# Patient Record
Sex: FEMALE | Race: WHITE | Employment: UNEMPLOYED | URBAN - METROPOLITAN AREA
[De-identification: names, ages, dates, MRNs, and addresses within clinical notes are randomized per-mention and may not be internally consistent; named-entity substitution may affect disease eponyms.]

---

## 2017-09-27 PROBLEM — R01.1 MURMUR, CARDIAC: Status: ACTIVE | Noted: 2017-09-27

## 2017-10-11 ENCOUNTER — HOSPITAL ENCOUNTER (OUTPATIENT)
Dept: CT IMAGING | Age: 52
Discharge: HOME | End: 2017-10-11
Attending: INTERNAL MEDICINE
Payer: SELF-PAY

## 2017-10-11 DIAGNOSIS — R01.1 MURMUR, CARDIAC: ICD-10-CM

## 2017-10-11 DIAGNOSIS — R07.89 OTHER CHEST PAIN: ICD-10-CM

## 2017-10-11 PROCEDURE — 75571 CT HRT W/O DYE W/CA TEST: CPT

## 2017-12-28 PROBLEM — F33.9 RECURRENT DEPRESSION (HCC): Status: ACTIVE | Noted: 2017-12-28

## 2017-12-28 PROBLEM — R07.9 CHEST PAIN: Status: ACTIVE | Noted: 2017-12-28

## 2019-06-03 ENCOUNTER — HOSPITAL ENCOUNTER (OUTPATIENT)
Age: 54
Setting detail: OBSERVATION
Discharge: HOME | End: 2019-06-04
Attending: INTERNAL MEDICINE | Admitting: INTERNAL MEDICINE
Payer: MEDICARE

## 2019-06-03 PROBLEM — F41.9 ANXIETY AND DEPRESSION: Status: ACTIVE | Noted: 2017-12-28

## 2019-06-03 PROBLEM — D72.829 LEUKOCYTOSIS: Status: ACTIVE | Noted: 2019-06-03

## 2019-06-03 PROBLEM — R73.03 PREDIABETES: Status: ACTIVE | Noted: 2019-06-03

## 2019-06-03 PROBLEM — Q24.8 PERICARDIAL CYST: Status: ACTIVE | Noted: 2019-06-03

## 2019-06-03 PROBLEM — F32.A ANXIETY AND DEPRESSION: Status: ACTIVE | Noted: 2017-12-28

## 2019-06-03 PROCEDURE — 65390000012 HC CONDITION CODE 44 OBSERVATION

## 2019-06-03 PROCEDURE — 65660000004 HC RM CVT STEPDOWN

## 2019-06-03 PROCEDURE — 74011250637 HC RX REV CODE- 250/637: Performed by: INTERNAL MEDICINE

## 2019-06-03 RX ORDER — VERAPAMIL HYDROCHLORIDE 240 MG/1
240 TABLET, FILM COATED, EXTENDED RELEASE ORAL
Status: DISCONTINUED | OUTPATIENT
Start: 2019-06-03 | End: 2019-06-04 | Stop reason: HOSPADM

## 2019-06-03 RX ORDER — PRAVASTATIN SODIUM 20 MG/1
40 TABLET ORAL
Status: DISCONTINUED | OUTPATIENT
Start: 2019-06-03 | End: 2019-06-04 | Stop reason: HOSPADM

## 2019-06-03 RX ORDER — DIAZEPAM 5 MG/1
5 TABLET ORAL 2 TIMES DAILY
Status: DISCONTINUED | OUTPATIENT
Start: 2019-06-03 | End: 2019-06-04 | Stop reason: HOSPADM

## 2019-06-03 RX ORDER — FLUOXETINE HYDROCHLORIDE 20 MG/1
20 CAPSULE ORAL 2 TIMES DAILY
Status: DISCONTINUED | OUTPATIENT
Start: 2019-06-03 | End: 2019-06-04 | Stop reason: HOSPADM

## 2019-06-03 RX ORDER — PERPHENAZINE 2 MG/1
16 TABLET, FILM COATED ORAL
Status: DISCONTINUED | OUTPATIENT
Start: 2019-06-03 | End: 2019-06-04 | Stop reason: HOSPADM

## 2019-06-03 RX ORDER — TRAMADOL HYDROCHLORIDE 50 MG/1
50 TABLET ORAL
Status: DISCONTINUED | OUTPATIENT
Start: 2019-06-03 | End: 2019-06-04 | Stop reason: HOSPADM

## 2019-06-03 RX ORDER — ZOLPIDEM TARTRATE 5 MG/1
5 TABLET ORAL
Status: DISCONTINUED | OUTPATIENT
Start: 2019-06-03 | End: 2019-06-04 | Stop reason: HOSPADM

## 2019-06-03 RX ORDER — HYDROCHLOROTHIAZIDE 25 MG/1
25 TABLET ORAL DAILY
Status: DISCONTINUED | OUTPATIENT
Start: 2019-06-04 | End: 2019-06-04 | Stop reason: HOSPADM

## 2019-06-03 RX ORDER — PANTOPRAZOLE SODIUM 40 MG/1
40 TABLET, DELAYED RELEASE ORAL
Status: DISCONTINUED | OUTPATIENT
Start: 2019-06-04 | End: 2019-06-04 | Stop reason: HOSPADM

## 2019-06-03 RX ORDER — FLUOXETINE HYDROCHLORIDE 20 MG/1
20 CAPSULE ORAL DAILY
COMMUNITY
End: 2021-09-29

## 2019-06-03 RX ORDER — AMITRIPTYLINE HYDROCHLORIDE 25 MG/1
50 TABLET, FILM COATED ORAL
Status: DISCONTINUED | OUTPATIENT
Start: 2019-06-03 | End: 2019-06-04 | Stop reason: HOSPADM

## 2019-06-03 RX ORDER — NITROGLYCERIN 0.4 MG/1
0.4 TABLET SUBLINGUAL
Status: DISCONTINUED | OUTPATIENT
Start: 2019-06-03 | End: 2019-06-04 | Stop reason: HOSPADM

## 2019-06-03 RX ORDER — SODIUM CHLORIDE 0.9 % (FLUSH) 0.9 %
5-40 SYRINGE (ML) INJECTION AS NEEDED
Status: DISCONTINUED | OUTPATIENT
Start: 2019-06-03 | End: 2019-06-04 | Stop reason: HOSPADM

## 2019-06-03 RX ORDER — SODIUM CHLORIDE 0.9 % (FLUSH) 0.9 %
5-40 SYRINGE (ML) INJECTION EVERY 8 HOURS
Status: DISCONTINUED | OUTPATIENT
Start: 2019-06-03 | End: 2019-06-04 | Stop reason: HOSPADM

## 2019-06-03 RX ADMIN — PRAVASTATIN SODIUM 40 MG: 20 TABLET ORAL at 22:21

## 2019-06-03 RX ADMIN — AMITRIPTYLINE HYDROCHLORIDE 50 MG: 25 TABLET, FILM COATED ORAL at 22:17

## 2019-06-03 RX ADMIN — PERPHENAZINE 16 MG: 2 TABLET, FILM COATED ORAL at 22:17

## 2019-06-03 RX ADMIN — VERAPAMIL HYDROCHLORIDE 240 MG: 240 TABLET, FILM COATED, EXTENDED RELEASE ORAL at 22:17

## 2019-06-03 RX ADMIN — TRAMADOL HYDROCHLORIDE 50 MG: 50 TABLET, FILM COATED ORAL at 23:15

## 2019-06-03 RX ADMIN — Medication 10 ML: at 22:25

## 2019-06-03 RX ADMIN — FLUOXETINE 20 MG: 20 CAPSULE ORAL at 22:17

## 2019-06-03 RX ADMIN — ZOLPIDEM TARTRATE 5 MG: 5 TABLET ORAL at 22:22

## 2019-06-04 VITALS
RESPIRATION RATE: 22 BRPM | OXYGEN SATURATION: 93 % | HEART RATE: 77 BPM | DIASTOLIC BLOOD PRESSURE: 68 MMHG | TEMPERATURE: 99.2 F | BODY MASS INDEX: 39.11 KG/M2 | WEIGHT: 235 LBS | SYSTOLIC BLOOD PRESSURE: 122 MMHG

## 2019-06-04 PROBLEM — R01.1 MURMUR, CARDIAC: Chronic | Status: ACTIVE | Noted: 2017-09-27

## 2019-06-04 PROBLEM — F41.9 ANXIETY AND DEPRESSION: Chronic | Status: ACTIVE | Noted: 2017-12-28

## 2019-06-04 PROBLEM — Q24.8 PERICARDIAL CYST: Chronic | Status: ACTIVE | Noted: 2019-06-03

## 2019-06-04 PROBLEM — R73.03 PREDIABETES: Chronic | Status: ACTIVE | Noted: 2019-06-03

## 2019-06-04 PROBLEM — R07.9 CHEST PAIN: Status: RESOLVED | Noted: 2017-12-28 | Resolved: 2019-06-04

## 2019-06-04 PROBLEM — D72.829 LEUKOCYTOSIS: Chronic | Status: ACTIVE | Noted: 2019-06-03

## 2019-06-04 PROBLEM — F32.A ANXIETY AND DEPRESSION: Chronic | Status: ACTIVE | Noted: 2017-12-28

## 2019-06-04 LAB
ANION GAP SERPL CALC-SCNC: 9 MMOL/L (ref 7–16)
ATRIAL RATE: 79 BPM
BUN SERPL-MCNC: 8 MG/DL (ref 6–23)
CALCIUM SERPL-MCNC: 9.4 MG/DL (ref 8.3–10.4)
CALCULATED P AXIS, ECG09: 65 DEGREES
CALCULATED R AXIS, ECG10: 34 DEGREES
CALCULATED T AXIS, ECG11: 65 DEGREES
CHLORIDE SERPL-SCNC: 105 MMOL/L (ref 98–107)
CHOLEST SERPL-MCNC: 177 MG/DL
CO2 SERPL-SCNC: 24 MMOL/L (ref 21–32)
CREAT SERPL-MCNC: 0.71 MG/DL (ref 0.6–1)
DIAGNOSIS, 93000: NORMAL
ERYTHROCYTE [DISTWIDTH] IN BLOOD BY AUTOMATED COUNT: 14.5 % (ref 11.9–14.6)
GLUCOSE SERPL-MCNC: 109 MG/DL (ref 65–100)
HCT VFR BLD AUTO: 40.4 % (ref 35.8–46.3)
HDLC SERPL-MCNC: 73 MG/DL (ref 40–60)
HDLC SERPL: 2.4 {RATIO}
HGB BLD-MCNC: 13.1 G/DL (ref 11.7–15.4)
INR PPP: 1.2
LDLC SERPL CALC-MCNC: 93.4 MG/DL
LIPID PROFILE,FLP: ABNORMAL
MCH RBC QN AUTO: 30 PG (ref 26.1–32.9)
MCHC RBC AUTO-ENTMCNC: 32.4 G/DL (ref 31.4–35)
MCV RBC AUTO: 92.4 FL (ref 79.6–97.8)
NRBC # BLD: 0 K/UL (ref 0–0.2)
P-R INTERVAL, ECG05: 154 MS
PLATELET # BLD AUTO: 242 K/UL (ref 150–450)
PMV BLD AUTO: 10.2 FL (ref 9.4–12.3)
POTASSIUM SERPL-SCNC: 4 MMOL/L (ref 3.5–5.1)
PROTHROMBIN TIME: 15 SEC (ref 11.7–14.5)
Q-T INTERVAL, ECG07: 398 MS
QRS DURATION, ECG06: 94 MS
QTC CALCULATION (BEZET), ECG08: 456 MS
RBC # BLD AUTO: 4.37 M/UL (ref 4.05–5.2)
SODIUM SERPL-SCNC: 138 MMOL/L (ref 136–145)
TRIGL SERPL-MCNC: 53 MG/DL (ref 35–150)
TROPONIN I SERPL-MCNC: <0.02 NG/ML (ref 0.02–0.05)
TROPONIN I SERPL-MCNC: <0.02 NG/ML (ref 0.02–0.05)
VENTRICULAR RATE, ECG03: 79 BPM
VLDLC SERPL CALC-MCNC: 10.6 MG/DL (ref 6–23)
WBC # BLD AUTO: 13.6 K/UL (ref 4.3–11.1)

## 2019-06-04 PROCEDURE — 65390000012 HC CONDITION CODE 44 OBSERVATION

## 2019-06-04 PROCEDURE — 99218 HC RM OBSERVATION: CPT

## 2019-06-04 PROCEDURE — 93005 ELECTROCARDIOGRAM TRACING: CPT | Performed by: NURSE PRACTITIONER

## 2019-06-04 PROCEDURE — 77030032490 HC SLV COMPR SCD KNE COVD -B

## 2019-06-04 PROCEDURE — 85610 PROTHROMBIN TIME: CPT

## 2019-06-04 PROCEDURE — 74011250637 HC RX REV CODE- 250/637: Performed by: INTERNAL MEDICINE

## 2019-06-04 PROCEDURE — 84484 ASSAY OF TROPONIN QUANT: CPT

## 2019-06-04 PROCEDURE — 36415 COLL VENOUS BLD VENIPUNCTURE: CPT

## 2019-06-04 PROCEDURE — 93306 TTE W/DOPPLER COMPLETE: CPT

## 2019-06-04 PROCEDURE — 80048 BASIC METABOLIC PNL TOTAL CA: CPT

## 2019-06-04 PROCEDURE — 80061 LIPID PANEL: CPT

## 2019-06-04 PROCEDURE — 85027 COMPLETE CBC AUTOMATED: CPT

## 2019-06-04 RX ADMIN — PANTOPRAZOLE SODIUM 40 MG: 40 TABLET, DELAYED RELEASE ORAL at 05:26

## 2019-06-04 RX ADMIN — TRAMADOL HYDROCHLORIDE 50 MG: 50 TABLET, FILM COATED ORAL at 07:18

## 2019-06-04 RX ADMIN — Medication 10 ML: at 05:26

## 2019-06-04 RX ADMIN — TRAMADOL HYDROCHLORIDE 50 MG: 50 TABLET, FILM COATED ORAL at 13:17

## 2019-06-04 RX ADMIN — DIAZEPAM 5 MG: 5 TABLET ORAL at 09:00

## 2019-06-04 RX ADMIN — FLUOXETINE 20 MG: 20 CAPSULE ORAL at 07:18

## 2019-06-04 RX ADMIN — HYDROCHLOROTHIAZIDE 25 MG: 25 TABLET ORAL at 12:20

## 2019-06-04 NOTE — PROGRESS NOTES
Hospitalist Progress Note     Admit Date:  6/3/2019  8:19 PM   Name:  Taina Son   Age:  52 y.o.  :  1965   MRN:  [id number]   PCP:  Óscar Blanchard MD  Treatment Team: Attending Provider: Van Isidro DO; Consulting Provider: Erlinda Alvarenga MD; Utilization Review: Lizeth Muhammad RN; Care Manager: Yola Quiroz    HPI/Subjective:   Pt directly admitted from outside ER for a pericardial cyst, chest pain. Cyst noted on previous CT in 2017 also     - no CP, SOB, fevers. Feels fine. Objective:     Patient Vitals for the past 24 hrs:   Temp Pulse Resp BP SpO2   19 1147 99.2 °F (37.3 °C) 77 22 122/68 93 %   19 0709 98.7 °F (37.1 °C) 77 20 112/54 93 %   19 0219 98.8 °F (37.1 °C) 74 18 110/56 92 %   19 2311 98.4 °F (36.9 °C) 85 18 153/77 96 %   19 2155 -- 84 -- 152/71 --   195 -- (!) 117 -- 135/66 --   19 98.9 °F (37.2 °C) 89 18 140/68 96 %     Oxygen Therapy  O2 Sat (%): 93 % (19 1147)  Pulse via Oximetry: 77 beats per minute (19 1147)  O2 Device: Room air (19 0219)      Intake/Output Summary (Last 24 hours) at 2019 1207  Last data filed at 2019 0721  Gross per 24 hour   Intake 1060 ml   Output 1900 ml   Net -840 ml       *Note that automatically entered I/Os may not be accurate; dependent on patient compliance with collection and accurate  by [employer]rTouch. General:    Well nourished. Alert. Extremities: Warm and dry. No cyanosis or edema. Skin:     No rashes or jaundice.    Neuro:  No gross focal deficits    Data Review:  I have reviewed all labs, meds, and studies from the last 24 hours:    Recent Results (from the past 24 hour(s))   TROPONIN I    Collection Time: 19 12:38 AM   Result Value Ref Range    Troponin-I, Qt. <0.02 (L) 0.02 - 0.05 NG/ML   CBC W/O DIFF    Collection Time: 19  4:40 AM   Result Value Ref Range    WBC 13.6 (H) 4.3 - 11.1 K/uL    RBC 4.37 4.05 - 5.2 M/uL    HGB 13.1 11.7 - 15.4 g/dL    HCT 40.4 35.8 - 46.3 %    MCV 92.4 79.6 - 97.8 FL    MCH 30.0 26.1 - 32.9 PG    MCHC 32.4 31.4 - 35.0 g/dL    RDW 14.5 11.9 - 14.6 %    PLATELET 980 483 - 373 K/uL    MPV 10.2 9.4 - 12.3 FL    ABSOLUTE NRBC 0.00 0.0 - 0.2 K/uL   METABOLIC PANEL, BASIC    Collection Time: 06/04/19  4:40 AM   Result Value Ref Range    Sodium 138 136 - 145 mmol/L    Potassium 4.0 3.5 - 5.1 mmol/L    Chloride 105 98 - 107 mmol/L    CO2 24 21 - 32 mmol/L    Anion gap 9 7 - 16 mmol/L    Glucose 109 (H) 65 - 100 mg/dL    BUN 8 6 - 23 MG/DL    Creatinine 0.71 0.6 - 1.0 MG/DL    GFR est AA >60 >60 ml/min/1.73m2    GFR est non-AA >60 >60 ml/min/1.73m2    Calcium 9.4 8.3 - 10.4 MG/DL   TROPONIN I    Collection Time: 06/04/19  4:40 AM   Result Value Ref Range    Troponin-I, Qt. <0.02 (L) 0.02 - 0.05 NG/ML   LIPID PANEL    Collection Time: 06/04/19  4:40 AM   Result Value Ref Range    LIPID PROFILE          Cholesterol, total 177 <200 MG/DL    Triglyceride 53 35 - 150 MG/DL    HDL Cholesterol 73 (H) 40 - 60 MG/DL    LDL, calculated 93.4 <100 MG/DL    VLDL, calculated 10.6 6.0 - 23.0 MG/DL    CHOL/HDL Ratio 2.4     PROTHROMBIN TIME + INR    Collection Time: 06/04/19  4:40 AM   Result Value Ref Range    Prothrombin time 15.0 (H) 11.7 - 14.5 sec    INR 1.2          All Micro Results     None          No results found for this visit on 06/03/19.     Current Meds:  Current Facility-Administered Medications   Medication Dose Route Frequency    amitriptyline (ELAVIL) tablet 50 mg  50 mg Oral QHS    diazePAM (VALIUM) tablet 5 mg  5 mg Oral BID    hydroCHLOROthiazide (HYDRODIURIL) tablet 25 mg  25 mg Oral DAILY    pantoprazole (PROTONIX) tablet 40 mg  40 mg Oral ACB    perphenazine (TRILAFON) tablet tab 16 mg  16 mg Oral QHS    pravastatin (PRAVACHOL) tablet 40 mg  40 mg Oral QHS    verapamil ER (CALAN-SR) tablet 240 mg  240 mg Oral QHS    zolpidem (AMBIEN) tablet 5 mg  5 mg Oral QHS PRN    sodium chloride (NS) flush 5-40 mL  5-40 mL IntraVENous Q8H    sodium chloride (NS) flush 5-40 mL  5-40 mL IntraVENous PRN    nitroglycerin (NITROSTAT) tablet 0.4 mg  0.4 mg SubLINGual Q5MIN PRN    promethazine (PHENERGAN) with saline injection 12.5 mg  12.5 mg IntraVENous Q6H PRN    FLUoxetine (PROzac) capsule 20 mg  20 mg Oral BID    traMADol (ULTRAM) tablet 50 mg  50 mg Oral Q6H PRN       Other Studies (last 24 hours):  No results found. Assessment and Plan:     Hospital Problems as of 6/4/2019 Date Reviewed: 12/28/2017          Codes Class Noted - Resolved POA    * (Principal) Pericardial cyst ICD-10-CM: Q24.8  ICD-9-CM: 746.89  6/3/2019 - Present Yes        Prediabetes (Chronic) ICD-10-CM: R73.03  ICD-9-CM: 790.29  6/3/2019 - Present Yes        Leukocytosis (Chronic) ICD-10-CM: S66.042  ICD-9-CM: 288.60  6/3/2019 - Present Yes        Anxiety and depression (Chronic) ICD-10-CM: F41.9, F32.9  ICD-9-CM: 300.00, 311  12/28/2017 - Present Yes        Chest pain ICD-10-CM: R07.9  ICD-9-CM: 786.50  12/28/2017 - Present Yes        Murmur, cardiac ICD-10-CM: R01.1  ICD-9-CM: 785.2  9/27/2017 - Present Yes              Plan:  · Pericardial cyst doesn't appear to be new finding, maybe slightly larger now. Noted on CT in Sept 2017. · Cardiology managing  · Cont home meds for chronic conditions  · Leukocytosis stable.  No fevers    DC planning/Dispo:    -discharge when OK with cardiology    Diet:  DIET NPO    Signed:  Lluvia Coppola MD

## 2019-06-04 NOTE — CONSULTS
[facility] Cardiology Consult     Attending Cardiologist:Dr. Enoc Cho     Primary Cardiologist:Dr. Luis Alberto Joseph     Primary Care Physician:Dr. Yun Aguilar     Referring--Dr. Robertson General cyst    Subjective:     Taina Son is a 52 y.o. mentally challenged female who Dr. Tierney Luna has seen in past for atypical chest pain, noting normal Echo and nuclear stress test in last two years. She presented to [facility] with complaints of dull chest discomfort. CT chest describes 7.3 x 5.1 x 7.7 cm loculated cystic structure adjacent to the left heart border, concerning for a cyst or effusion. Serial cardiac enzymes were negative. She continues to have mild chest discomfort. NO associated SOB, diaphoresis, nausea or vomiting with CP. Denies exertional chest discomfort. Nonsmoker. NO prior heart cath. CT chest in 2017 with cystic lesion described then. She states she never had follow up regarding this study. She is not tachycardic or SOB on my exam. She appears to be in no distress. Pt does endorse hx of pre DM, HTN.          Past Medical History:   Diagnosis Date    Allergic rhinitis     Depression     Fibrocystic disease of breast     Gastroesophageal reflux disease     Hyperlipidemia     Hypertension       Past Surgical History:   Procedure Laterality Date    HX OTHER SURGICAL  2007    cyst removed from right breast    HX TONSILLECTOMY        Current Facility-Administered Medications   Medication Dose Route Frequency    amitriptyline (ELAVIL) tablet 50 mg  50 mg Oral QHS    diazePAM (VALIUM) tablet 5 mg  5 mg Oral BID    hydroCHLOROthiazide (HYDRODIURIL) tablet 25 mg  25 mg Oral DAILY    pantoprazole (PROTONIX) tablet 40 mg  40 mg Oral ACB    perphenazine (TRILAFON) tablet tab 16 mg  16 mg Oral QHS    pravastatin (PRAVACHOL) tablet 40 mg  40 mg Oral QHS    verapamil ER (CALAN-SR) tablet 240 mg  240 mg Oral QHS    zolpidem (AMBIEN) tablet 5 mg  5 mg Oral QHS PRN    sodium chloride (NS) flush 5-40 mL  5-40 mL IntraVENous Q8H    sodium chloride (NS) flush 5-40 mL  5-40 mL IntraVENous PRN    nitroglycerin (NITROSTAT) tablet 0.4 mg  0.4 mg SubLINGual Q5MIN PRN    promethazine (PHENERGAN) with saline injection 12.5 mg  12.5 mg IntraVENous Q6H PRN    FLUoxetine (PROzac) capsule 20 mg  20 mg Oral BID    traMADol (ULTRAM) tablet 50 mg  50 mg Oral Q6H PRN     Allergies   Allergen Reactions    Ace Inhibitors Cough    Benadryl [Diphenhydramine Hcl] Unknown (comments)    Ciprofloxacin Unknown (comments)    Motrin [Ibuprofen] Unknown (comments)    Penicillins Unknown (comments)    Plavix [Clopidogrel] Unknown (comments)    Wellbutrin [Bupropion Hcl] Unknown (comments)      Social History     Tobacco Use    Smoking status: Never Smoker    Smokeless tobacco: Never Used   Substance Use Topics    Alcohol use: No      No family history on file. Review of Systems  Gen: Denies fever, chills, malaise or fatigue. Appetite good. HEENT: Denies frequent headaches, dizzyness, visual disturbances, Neck pain or swallowing difficulty  Lungs: Denies shortness of breath, hx of COPD, breathing problems  Cardiovascular: as above, ? Periods of syncope or near syncope--ill discribed by pt. GI: Denies hememesis, dark tarry stools, No prior Hx of GI bleed, Denies constipation  : Denies dysuria, no complaints of frequency, nocturia  Heme: No prior bleeding disorders, no prior Cancer  Neuro: Denies prior CVA, TIA. Endocrine: no diabetes, thyroid disorders  Psychiatric: Denies anxiety, or other psychiatric illnesses. Objective:     Visit Vitals  /68   Pulse 77   Temp 99.2 °F (37.3 °C)   Resp 22   Wt 106.6 kg (235 lb)   SpO2 93%   BMI 39.11 kg/m²     General:Alert, cooperative, no distress, appears stated age  Head: Normocephalic, without obvious abnormality, atraumatic. Eyes: Conjunctivae/corneas clear. PERRL, EOMs intact  Nose:Nares normal. Septum midline. Mucosa normal. No drainage or sinus tenderness. Throat: Lips, mucosa, and tongue normal. Teeth and gums normal.   Neck: Supple, symmetrical, trachea midline,  no carotid bruit and no JVD. Lungs:Clear to auscultation bilaterally. Chest wall: No tenderness or deformity. Heart: Regular rate and rhythm, S1, S2 normal, no murmur, click, rub or gallop. Abdomen:Soft, non-tender. Bowel sounds normal. No masses, No organomegaly. Extremities: Extremities normal, atraumatic, no cyanosis or edema. Pulses: 2+ and symmetric all extremities.     Skin: Skin color, texture, turgor normal. No rashes or lesions  Lymph nodes: Cervical, supraclavicular, and axillary nodes normal  Neurologic:No focal deficits identified                 ECG:     Data Review:     Recent Results (from the past 24 hour(s))   TROPONIN I    Collection Time: 06/04/19 12:38 AM   Result Value Ref Range    Troponin-I, Qt. <0.02 (L) 0.02 - 0.05 NG/ML   CBC W/O DIFF    Collection Time: 06/04/19  4:40 AM   Result Value Ref Range    WBC 13.6 (H) 4.3 - 11.1 K/uL    RBC 4.37 4.05 - 5.2 M/uL    HGB 13.1 11.7 - 15.4 g/dL    HCT 40.4 35.8 - 46.3 %    MCV 92.4 79.6 - 97.8 FL    MCH 30.0 26.1 - 32.9 PG    MCHC 32.4 31.4 - 35.0 g/dL    RDW 14.5 11.9 - 14.6 %    PLATELET 084 151 - 577 K/uL    MPV 10.2 9.4 - 12.3 FL    ABSOLUTE NRBC 0.00 0.0 - 0.2 K/uL   METABOLIC PANEL, BASIC    Collection Time: 06/04/19  4:40 AM   Result Value Ref Range    Sodium 138 136 - 145 mmol/L    Potassium 4.0 3.5 - 5.1 mmol/L    Chloride 105 98 - 107 mmol/L    CO2 24 21 - 32 mmol/L    Anion gap 9 7 - 16 mmol/L    Glucose 109 (H) 65 - 100 mg/dL    BUN 8 6 - 23 MG/DL    Creatinine 0.71 0.6 - 1.0 MG/DL    GFR est AA >60 >60 ml/min/1.73m2    GFR est non-AA >60 >60 ml/min/1.73m2    Calcium 9.4 8.3 - 10.4 MG/DL   TROPONIN I    Collection Time: 06/04/19  4:40 AM   Result Value Ref Range    Troponin-I, Qt. <0.02 (L) 0.02 - 0.05 NG/ML   LIPID PANEL    Collection Time: 06/04/19  4:40 AM   Result Value Ref Range    LIPID PROFILE Cholesterol, total 177 <200 MG/DL    Triglyceride 53 35 - 150 MG/DL    HDL Cholesterol 73 (H) 40 - 60 MG/DL    LDL, calculated 93.4 <100 MG/DL    VLDL, calculated 10.6 6.0 - 23.0 MG/DL    CHOL/HDL Ratio 2.4     PROTHROMBIN TIME + INR    Collection Time: 06/04/19  4:40 AM   Result Value Ref Range    Prothrombin time 15.0 (H) 11.7 - 14.5 sec    INR 1.2           Assessment / Plan     Principal Problem:    Pericardial cyst (6/3/2019)--present on CT in 2017--we will ask Radiologist here to evaluate images from [place]. Echo today notes no tamponade physiology. No valvular insufficiency, normal EF. Likely be observation of cyst but will await Radilogist input.      Active Problems:    Murmur, cardiac (9/27/2017)      Anxiety and depression (12/28/2017)      Chest pain (12/28/2017)      Prediabetes (6/3/2019)      Leukocytosis (6/3/2019)              Tere Whitehead NP

## 2019-06-04 NOTE — PROGRESS NOTES
Bedside shift report given to Alex Hilton RN (oncoming nurse) by Eliza Romero RN (offgoing nurse). Bedside shift report included the following information: SBAR, Kardex, ED Summary, MAR, and Recent Results.

## 2019-06-04 NOTE — H&P
HOSPITALIST H&P      NAME:  Doni Mccloud   Age:  52 y.o.  :   1965   MRN:   [id number]    PCP: Winston Melgar MD    Attending MD: Michelle Lucas DO    Treatment Team: Attending Provider: Jocelyne Diaz MD    HPI:     Doni Mccluod is a 52year old CF with a PMH of anxiety/deprssion, mild MRDD (per mother), compulsion disorder, prediabetes, and HTN who presented to [facility] from [facility] with acute substernal chest pain radiating to the left jaw. She was found to have a 7.3 x 5.1 x 7.7 cm loculated cystic structure adjacent to the left heart border, concerning for a cyst or effusion. Her CP resolved with Tramadol, but returned at a lower pain level once she arrived to the hospital. Currently she complains of mild substernal chest pain. Denies F/C/N/V. Denies diaphoresis. Currently denies left jaw pain. Complete ROS done and is as stated in HPI or otherwise negative    Past Medical History:   Diagnosis Date    Allergic rhinitis     Depression     Fibrocystic disease of breast     Gastroesophageal reflux disease     Hyperlipidemia     Hypertension         Past Surgical History:   Procedure Laterality Date    HX OTHER SURGICAL  2007    cyst removed from right breast    HX TONSILLECTOMY          Prior to Admission Medications   Prescriptions Last Dose Informant Patient Reported? Taking?   amitriptyline (ELAVIL) 50 mg tablet   Yes No   Sig: Take  by mouth nightly. aspirin delayed-release 81 mg tablet   Yes No   Sig: Take  by mouth daily. diazePAM (VALIUM) 5 mg tablet   Yes No   Sig: Take 5 mg by mouth two (2) times a day. hydroCHLOROthiazide (HYDRODIURIL) 25 mg tablet   Yes No   Sig: Take 25 mg by mouth daily. omeprazole (PRILOSEC) 20 mg capsule   Yes No   Sig: Take 20 mg by mouth daily. perphenazine (TRILAFON) 8 mg tablet   Yes No   Sig: Take 8 mg by mouth two (2) times a day.  Takes 1 po qam and 2 po qhs   pravastatin (PRAVACHOL) 40 mg tablet   Yes No   Sig: Take 40 mg by mouth nightly. promethazine (PHENERGAN) 25 mg tablet   Yes No   Sig: Take 25 mg by mouth every six (6) hours as needed for Nausea. verapamil ER (CALAN-SR) 240 mg CR tablet   Yes No   Sig: Take  by mouth nightly. zolpidem (AMBIEN) 10 mg tablet   Yes No   Sig: Take  by mouth nightly as needed for Sleep. Facility-Administered Medications: None       Allergies   Allergen Reactions    Ace Inhibitors Cough    Benadryl [Diphenhydramine Hcl] Unknown (comments)    Ciprofloxacin Unknown (comments)    Motrin [Ibuprofen] Unknown (comments)    Penicillins Unknown (comments)    Plavix [Clopidogrel] Unknown (comments)    Wellbutrin [Bupropion Hcl] Unknown (comments)        Social History     Tobacco Use    Smoking status: Never Smoker    Smokeless tobacco: Never Used   Substance Use Topics    Alcohol use: No        FH: Mother - HTN, Father - HTN, HLD     Objective: There were no vitals taken for this visit. No data recorded. Physical Exam:    General:    Alert, cooperative, no distress, appears stated age. Eyes:   PERRLA EOMI Anicteric  Head:   Normocephalic, without obvious abnormality, atraumatic. ENT:  Nares normal. No drainage. Lungs:   Clear to auscultation bilaterally. No Wheezing or Rhonchi. No rales. Heart:   Regular rate and rhythm,  diastolic murmur, rub or gallop. No JVD. Abdomen:   Soft, non-tender. Not distended. Bowel sounds normal.   MSK:  No edema. No clubbing or cyanosis. No deformities/lesions. Skin:     Texture, turgor normal. No rashes or lesions. No Jaundice. Neurologic: Alert and oriented x 3, no focal deficits   Psychiatry:      No depression/anxiety. Mood congruent for context. Heme/Lymph/Immune: No echymoses or overt signs of bleeding. Lab/Data Review:  No results found for this or any previous visit (from the past 24 hour(s)). Imaging:  No results found. Cultures:   All Micro Results     None Assessment/Plan:     Principal Problem:    Pericardial cyst (6/3/2019)  - CT Chest w contrast showed 7.3 x 5.1 x 7.7 cm loculated cystic structure adjacent to the left heart border, concerning for a cyst or effusion  - Consult Cardiology for assistance  - Vitals stable  - Check ECHO    Active Problems:    Chest pain (12/28/2017)  - ? Due to #1  - Initial troponin negative --> will trend x 2 more since still with mild CP  - Got ASA at ER --> will hold until ok with Cardiology  - Check ECHO  - Consult Cardiology      Leukocytosis (6/3/2019)  - WBC 13.8 at [facility]  - No s/s of active infection  - Will hold off on abx  - Check CBC in AM      Prediabetes (6/3/2019)      Murmur, cardiac (9/27/2017)  - Chronic and known  - Check ECHO      Anxiety and depression (12/28/2017)  - Continue Perphenazine  - Continue Prozac  - Continue Amitriptyline    Code Status: FULL CODE  DVT Prophylaxis: SCDs    Anticipated discharge: 3 days    Rekha Larsen DO  8:45 PM

## 2019-06-04 NOTE — PROGRESS NOTES
Discharge instructions, follow up appointments and prescriptions reviewed with patient and family. Both verbalize understanding. All personal belongings taken with patient. Family member will drive patient home. Patient escorted to discharge area via wheelchair. Patient is stable at discharge. PIV and monitor removed. No Rx given none ordered. Instructed patient to take Tylenol for pain in chest she could try extra strength 500 mg every 8 hours.

## 2019-06-04 NOTE — DISCHARGE SUMMARY
Hospitalist Discharge Summary     Admit Date:  6/3/2019  8:19 PM   Name:  Quiana Valdez   Age:  52 y.o.  :  1965   MRN:  [id number]   PCP:  Jessica Rodriguez MD  Treatment Team: Attending Provider: Justin Servin DO; Consulting Provider: Stephanie Martinez MD; Utilization Review: Mervin Feliz RN; Care Manager: Chastity Stinson    Problem List for this Hospitalization:  Hospital Problems as of 2019 Date Reviewed: 2017          Codes Class Noted - Resolved POA    * (Principal) Pericardial cyst (Chronic) ICD-10-CM: Q24.8  ICD-9-CM: 746.89  6/3/2019 - Present Yes        Prediabetes (Chronic) ICD-10-CM: R73.03  ICD-9-CM: 790.29  6/3/2019 - Present Yes        Leukocytosis (Chronic) ICD-10-CM: V36.795  ICD-9-CM: 288.60  6/3/2019 - Present Yes        Anxiety and depression (Chronic) ICD-10-CM: F41.9, F32.9  ICD-9-CM: 300.00, 311  2017 - Present Yes        Murmur, cardiac (Chronic) ICD-10-CM: R01.1  ICD-9-CM: 785.2  2017 - Present Yes        RESOLVED: Chest pain ICD-10-CM: R07.9  ICD-9-CM: 786.50  2017 - 2019 Yes                Admission HPI from 6/3/2019:    \" Quiana Valdez is a 52year old CF with a PMH of anxiety/deprssion, mild MRDD (per mother), compulsion disorder, prediabetes, and HTN who presented to [facility] [place] [facility] with acute substernal chest pain radiating to the left jaw. She was found to have a 7.3 x 5.1 x 7.7 cm loculated cystic structure adjacent to the left heart border, concerning for a cyst or effusion. Her CP resolved with Tramadol, but returned at a lower pain level once she arrived to the hospital. Currently she complains of mild substernal chest pain. Denies F/C/N/V. Denies diaphoresis. Currently denies left jaw pain. \"    Hospital Course:  Pt directly admitted from outside [address] [place] [address] for a pericardial cyst, chest pain. Cyst noted on previous CT in 2017 also. No changes. trops neg. Echo done.   See cardiology note below. [address] [address] English for discharge. WBC elevated but only mildly, stable vs yesterday without abx, and no symptoms or other workup suggestive of infection. Last cardiology note:  \"CT images from [facility] reviewed with Radiologist and he is in agreement that this appears to be Pericardial cyst with no real change in size since 2017. Dr. Azra Jules feels with symptomatic treatment with motrin initially but she is allergic --with hives, would continue PRN Tylenol. Will arrange follow up. \"    Disposition: Home or Self Care  Activity: Activity as tolerated  Diet: DIET REGULAR    Follow up instructions, discharge meds at bottom of this note. Plan was discussed with pt, mother. All questions answered. Patient was stable at time of discharge. Patient will call a physician or return if any concerns. Diagnostic Imaging/Tests:   No results found. Echocardiogram results:  Results for orders placed or performed during the hospital encounter of 19   2D ECHO COMPLETE ADULT (TTE) W OR [address] [facility] [address] [place] [address], [facility] W [facility]  [phone]    Transthoracic Echocardiogram  2D, M-mode, Doppler, and Color Doppler    Patient: Natali Lau  MR #: [id number]  : 1965  Age: 52 years  Gender: Female  Study date: 2019  Account #: [de-identified]  Height: 65 in  Weight: 234.5 lb  BSA: 2.12 mï¾²  Status:Routine  Location: [facility]  BP: 110/ 56    Allergies: ACE INHIBITORS, DIPHENHYDRAMINE HCL, CIPROFLOXACIN, IBUPROFEN,  PENICILLINS, CLOPIDOGREL, BUPROPION HCL    Sonographer:  Jessy Diego RDCS  Group:  [facility]  Referring Physician:  Oh Li MD  Reading Physician:  Patricio Uribe. MD [facility]    INDICATIONS: Pericardial Cyst vs Pericardial Effusion. PROCEDURE: This was a routine study. A transthoracic echocardiogram was  performed.  The study included complete 2D imaging, M-mode, complete spectral  Doppler, and color Doppler. Image quality was adequate. LEFT VENTRICLE: Size was normal. Systolic function was normal. Ejection  fraction was estimated in the range of 60 % to 65 %. There were no regional  wall motion abnormalities. Wall thickness was normal. The E/e' ratio was   9.95. Left ventricular diastolic function parameters were normal.    RIGHT VENTRICLE: The size was normal. Systolic function was normal. The  tricuspid jet envelope definition was inadequate for estimation of RV   systolic  pressure. LEFT ATRIUM: Size was normal.    RIGHT ATRIUM: Size was normal.    SYSTEMIC VEINS: IVC: The inferior vena cava was not well visualized. AORTIC VALVE: The valve was probably trileaflet. Leaflets exhibited mild  sclerosis. There was no evidence for stenosis. There was no insufficiency. MITRAL VALVE: Valve structure was normal. There was no evidence for stenosis. There was trace regurgitation. TRICUSPID VALVE: The valve structure was normal. There was no evidence for  stenosis. There was trace regurgitation. PULMONIC VALVE: The valve structure was normal. There was no evidence for  stenosis. There was no insufficiency. PERICARDIUM: A hypoechoic area was noted below the pericardium, posteriorly   in  the subcostal window along the lateral free wall which is a possible cyst vs   a  fluid collection with no hemodynamic significance/compression of the   ventricle. Consider further imaging as clinically indicated. AORTA: The root exhibited normal size. SUMMARY:    -  Left ventricle: Systolic function was normal. Ejection fraction was  estimated in the range of 60 % to 65 %. There were no regional wall motion  abnormalities. -  Pericardium: A hypoechoic area was noted below the pericardium,   posteriorly  in the subcostal window along the lateral free wall which is a possible cyst   vs  a fluid collection with no hemodynamic significance/compression of the  ventricle.  Consider further imaging as clinically indicated. SYSTEM MEASUREMENT TABLES    2D mode  AoR Diam (2D): 3.2 cm  LA Dimension (2D): 3.3 cm  Left Atrium Systolic Volume Index; Method of Disks, Biplane; 2D mode;: 22.2  ml/m2  IVS/LVPW (2D): 1  IVSd (2D): 1.1 cm  LVIDd (2D): 4.6 cm  LVIDs (2D): 2.7 cm  LVOT Area (2D): 3.1 cm2  LVPWd (2D): 1.1 cm  RVIDd (2D): 2.4 cm    Tissue Doppler Imaging  LV Peak Early Daigle Tissue Deejay; Lateral MA (TDI): 10.6 cm/s  LV Peak Early Daigle Tissue Deejay; Medial MA (TDI): 8.5 cm/s    Unspecified Scan Mode  Peak Grad; Mean; Antegrade Flow: 12 mm[Hg]  Vmax; Antegrade Flow: 163 cm/s  LVOT Diam: 2 cm  MV Peak Deejay/LV Peak Tissue Deejay E-Wave; Lateral MA: 8.8  MV Peak Deejay/LV Peak Tissue Deejay E-Wave; Medial MA: 11.1    Prepared and signed by    Olvin Llanos MD  Signed 04-Jun-2019 12:58:20         Procedures done this admission:  * No surgery found *    All Micro Results     None          Labs: Results:       BMP, Mg, Phos Recent Labs     06/04/19  0440      K 4.0      CO2 24   AGAP 9   BUN 8   CREA 0.71   CA 9.4   *      CBC Recent Labs     06/04/19  0440   WBC 13.6*   RBC 4.37   HGB 13.1   HCT 40.4         LFT No results for input(s): SGOT, ALT, TBIL, AP, TP, ALB, GLOB, AGRAT, GPT in the last 72 hours.    Cardiac Testing Lab Results   Component Value Date/Time    Troponin-I, Qt. <0.02 (L) 06/04/2019 04:40 AM    Troponin-I, Qt. <0.02 (L) 06/04/2019 12:38 AM      Coagulation Tests Lab Results   Component Value Date/Time    Prothrombin time 15.0 (H) 06/04/2019 04:40 AM    INR 1.2 06/04/2019 04:40 AM      A1c No results found for: HBA1C, HGBE8, ZMC6QGJK, EYP0HVXS   Lipid Panel Lab Results   Component Value Date/Time    Cholesterol, total 177 06/04/2019 04:40 AM    HDL Cholesterol 73 (H) 06/04/2019 04:40 AM    LDL, calculated 93.4 06/04/2019 04:40 AM    VLDL, calculated 10.6 06/04/2019 04:40 AM    Triglyceride 53 06/04/2019 04:40 AM    CHOL/HDL Ratio 2.4 06/04/2019 04:40 AM      Thyroid Panel No results found for: TSH, T4, FT4, TT3, T3U, TSHEXT, TSHEXT     Most Recent UA No results found for: COLOR, APPRN, REFSG, KRISHNA, PROTU, GLUCU, KETU, BILU, BLDU, UROU, LAURA, LEUKU, WBCU, RBCU, UEPI, BACTU, CASTS, UCRY, MUCUS, UCOM     Allergies   Allergen Reactions    Ace Inhibitors Cough    Benadryl [Diphenhydramine Hcl] Unknown (comments)    Ciprofloxacin Unknown (comments)    Motrin [Ibuprofen] Unknown (comments)    Penicillins Unknown (comments)    Plavix [Clopidogrel] Unknown (comments)    Wellbutrin [Bupropion Hcl] Unknown (comments)       There is no immunization history on file for this patient.     All Labs from Last 24 Hrs:  Recent Results (from the past 24 hour(s))   TROPONIN I    Collection Time: 06/04/19 12:38 AM   Result Value Ref Range    Troponin-I, Qt. <0.02 (L) 0.02 - 0.05 NG/ML   CBC W/O DIFF    Collection Time: 06/04/19  4:40 AM   Result Value Ref Range    WBC 13.6 (H) 4.3 - 11.1 K/uL    RBC 4.37 4.05 - 5.2 M/uL    HGB 13.1 11.7 - 15.4 g/dL    HCT 40.4 35.8 - 46.3 %    MCV 92.4 79.6 - 97.8 FL    MCH 30.0 26.1 - 32.9 PG    MCHC 32.4 31.4 - 35.0 g/dL    RDW 14.5 11.9 - 14.6 %    PLATELET 276 766 - 593 K/uL    MPV 10.2 9.4 - 12.3 FL    ABSOLUTE NRBC 0.00 0.0 - 0.2 K/uL   METABOLIC PANEL, BASIC    Collection Time: 06/04/19  4:40 AM   Result Value Ref Range    Sodium 138 136 - 145 mmol/L    Potassium 4.0 3.5 - 5.1 mmol/L    Chloride 105 98 - 107 mmol/L    CO2 24 21 - 32 mmol/L    Anion gap 9 7 - 16 mmol/L    Glucose 109 (H) 65 - 100 mg/dL    BUN 8 6 - 23 MG/DL    Creatinine 0.71 0.6 - 1.0 MG/DL    GFR est AA >60 >60 ml/min/1.73m2    GFR est non-AA >60 >60 ml/min/1.73m2    Calcium 9.4 8.3 - 10.4 MG/DL   TROPONIN I    Collection Time: 06/04/19  4:40 AM   Result Value Ref Range    Troponin-I, Qt. <0.02 (L) 0.02 - 0.05 NG/ML   LIPID PANEL    Collection Time: 06/04/19  4:40 AM   Result Value Ref Range    LIPID PROFILE          Cholesterol, total 177 <200 MG/DL    Triglyceride 53 35 - 150 MG/DL HDL Cholesterol 73 (H) 40 - 60 MG/DL    LDL, calculated 93.4 <100 MG/DL    VLDL, calculated 10.6 6.0 - 23.0 MG/DL    CHOL/HDL Ratio 2.4     PROTHROMBIN TIME + INR    Collection Time: 06/04/19  4:40 AM   Result Value Ref Range    Prothrombin time 15.0 (H) 11.7 - 14.5 sec    INR 1.2     EKG, 12 LEAD, INITIAL    Collection Time: 06/04/19 12:28 PM   Result Value Ref Range    Ventricular Rate 79 BPM    Atrial Rate 79 BPM    P-R Interval 154 ms    QRS Duration 94 ms    Q-T Interval 398 ms    QTC Calculation (Bezet) 456 ms    Calculated P Axis 65 degrees    Calculated R Axis 34 degrees    Calculated T Axis 65 degrees    Diagnosis       Normal sinus rhythm  Normal ECG  No previous ECGs available  Confirmed by BRIAN WHITT (), Dia aHrrison ([id number]) on 6/4/2019 12:52:32 PM         Current Med List in Hospital:   Current Facility-Administered Medications   Medication Dose Route Frequency    amitriptyline (ELAVIL) tablet 50 mg  50 mg Oral QHS    diazePAM (VALIUM) tablet 5 mg  5 mg Oral BID    hydroCHLOROthiazide (HYDRODIURIL) tablet 25 mg  25 mg Oral DAILY    pantoprazole (PROTONIX) tablet 40 mg  40 mg Oral ACB    perphenazine (TRILAFON) tablet tab 16 mg  16 mg Oral QHS    pravastatin (PRAVACHOL) tablet 40 mg  40 mg Oral QHS    verapamil ER (CALAN-SR) tablet 240 mg  240 mg Oral QHS    zolpidem (AMBIEN) tablet 5 mg  5 mg Oral QHS PRN    sodium chloride (NS) flush 5-40 mL  5-40 mL IntraVENous Q8H    sodium chloride (NS) flush 5-40 mL  5-40 mL IntraVENous PRN    nitroglycerin (NITROSTAT) tablet 0.4 mg  0.4 mg SubLINGual Q5MIN PRN    promethazine (PHENERGAN) with saline injection 12.5 mg  12.5 mg IntraVENous Q6H PRN    FLUoxetine (PROzac) capsule 20 mg  20 mg Oral BID    traMADol (ULTRAM) tablet 50 mg  50 mg Oral Q6H PRN       Discharge Exam:  Patient Vitals for the past 24 hrs:   Temp Pulse Resp BP SpO2   06/04/19 1147 99.2 °F (37.3 °C) 77 22 122/68 93 %   06/04/19 0709 98.7 °F (37.1 °C) 77 20 112/54 93 %   06/04/19 0219 98.8 °F (37.1 °C) 74 18 110/56 92 %   06/03/19 2311 98.4 °F (36.9 °C) 85 18 153/77 96 %   06/03/19 2155 -- 84 -- 152/71 --   06/03/19 2055 -- (!) 117 -- 135/66 --   06/03/19 2023 98.9 °F (37.2 °C) 89 18 140/68 96 %     Oxygen Therapy  O2 Sat (%): 93 % (06/04/19 1147)  Pulse via Oximetry: 77 beats per minute (06/04/19 1147)  O2 Device: Room air (06/04/19 0219)    Intake/Output Summary (Last 24 hours) at 6/4/2019 1359  Last data filed at 6/4/2019 0721  Gross per 24 hour   Intake 1060 ml   Output 1900 ml   Net -840 ml       *Note that automatically entered I/Os may not be accurate; dependent on patient compliance with collection and accurate  by assistants. General:    Well nourished. Alert. Eyes:   Normal sclerae. Extraocular movements intact. Extremities: Warm and dry. No cyanosis or edema. Neurologic: CN II-XII grossly intact. Sensation intact. Skin:     No rashes or jaundice. Psych:  Normal mood and affect. Discharge Info:   Current Discharge Medication List      CONTINUE these medications which have NOT CHANGED    Details   FLUoxetine (PROZAC) 20 mg capsule Take 20 mg by mouth two (2) times a day. promethazine (PHENERGAN) 25 mg tablet Take 25 mg by mouth every six (6) hours as needed for Nausea. zolpidem (AMBIEN) 10 mg tablet Take  by mouth nightly as needed for Sleep.      pravastatin (PRAVACHOL) 40 mg tablet Take 40 mg by mouth nightly. perphenazine (TRILAFON) 8 mg tablet Take 8 mg by mouth two (2) times a day. Takes 1 po qam and 2 po qhs      diazePAM (VALIUM) 5 mg tablet Take 5 mg by mouth two (2) times a day. hydroCHLOROthiazide (HYDRODIURIL) 25 mg tablet Take 25 mg by mouth daily. omeprazole (PRILOSEC) 20 mg capsule Take 20 mg by mouth daily. verapamil ER (CALAN-SR) 240 mg CR tablet Take  by mouth nightly. amitriptyline (ELAVIL) 50 mg tablet Take  by mouth nightly. aspirin delayed-release 81 mg tablet Take  by mouth daily.              Follow Up Orders:  No orders of the defined types were placed in this encounter. Follow-up Information     Follow up With Specialties Details Why Contact Info    Franklyn Sung MD Cardiology  July 3 @ 8:45 in [facility] office  Deniz Hernandez [address]      Priyanka Thakkar MD Family Practice Call As needed [address] [facility]  [phone]            Time spent in patient discharge planning and coordination 35 minutes.     Signed:  Berenice De La Cruz MD

## 2019-06-04 NOTE — PHYSICIAN ADVISORY
Letter of Determination:  Outpatient status receiving Observation Services    This patient was originally hospitalized as Inpatient Status on 6/3/2019 for evaluation of pericardial cyst.  At this time this patient does not appear to meet the medical necessity requirements in CMS regulation Section 43 .3 to support an inpatient level of care. It is our recommendation that this patient's hospitalization status should be changed from INPATIENT to [place] receiving OBSERVATION services via Condition Code 44.      This may change due to the medical condition of the patient and new clinical evidence as the patients care progreses. The final decision regarding the patient's hospitalization status depends on the attending physician's judgement.     Alla Ospina MD, CRISTINE,   Physician Grzegorz Tesfaye.

## 2019-06-04 NOTE — PROGRESS NOTES
TRANSFER - IN REPORT:    Verbal report received from Augusta Avila RN (name) on Courtney Ace  being received from ED @ [facility] (unit) for routine progression of care      Report consisted of patients Situation, Background, Assessment and   Recommendations(SBAR). Information from the following report(s) SBAR, ED Summary, Recent Results and Cardiac Rhythm NSR was reviewed with the receiving nurse. Opportunity for questions and clarification was provided. Assessment completed upon patients arrival to unit and care assumed. Dual nurse skin assessment completed. Skin warm, dry and intact. Sacrum and heels assessed, skin intact with no signs of skin breakdown. No other skin issues noted.

## 2019-06-04 NOTE — PROGRESS NOTES
Care Management Interventions  PCP Verified by CM: Yes(Preston Esqueda)  Mode of Transport at Discharge: Other (see comment)(mother)  Transition of Care Consult (CM Consult): Discharge Planning  Physical Therapy Consult: No  Occupational Therapy Consult: No  Speech Therapy Consult: No  Current Support Network: Other(Pt lives with mother)  Confirm Follow Up Transport: Family  Freedom of Choice Offered: Yes  Discharge Location  Discharge Placement: Home    This CM met with pt and her mother at bedside this day. Pt confirmed her PCP, insurance, emergency contact, and home address. They report no difficulty obtaining pt's medications in the community. Per her mother's report to physician, pt has mild MRDD. Pt lives at home with her mother with a ramped entrance. Pt has no home DME. They confirm that at baseline pt is independent with her ADLs including bathing, dressing, and light cooking. Pt does not drive. We discussed discharge planning as pt has discharge orders to discharge the hospital this day. Pt to return home with mother this day. We discussed pt's hospital admission status from inpatient to observation. At pt's mother's request, this CM explained the Code 44 form and [place] letter to the pt who also signed forms. Noted PW received in document list on pt's electronic chart. Pt and mother request their copy be placed with the discharge paperwork. Signed copies placed on pt's chart. No additional discharge needs at this time. Milestones met.

## 2019-06-04 NOTE — PROGRESS NOTES
Called cardiology consult to Faustino Ashraf NP. Verified with Dr. Yi that okay to call consult in AM. Will pass along to oncoming RN.

## 2019-06-04 NOTE — DISCHARGE INSTRUCTIONS
DISCHARGE SUMMARY from Nurse    PATIENT INSTRUCTIONS:    After general anesthesia or intravenous sedation, for 24 hours or while taking prescription Narcotics:  · Limit your activities  · Do not drive and operate hazardous machinery  · Do not make important personal or business decisions  · Do  not drink alcoholic beverages  · If you have not urinated within 8 hours after discharge, please contact your surgeon on call. Report the following to your surgeon:  · Excessive pain, swelling, redness or odor of or around the surgical area  · Temperature over 100.5  · Nausea and vomiting lasting longer than 4 hours or if unable to take medications  · Any signs of decreased circulation or nerve impairment to extremity: change in color, persistent  numbness, tingling, coldness or increase pain  · Any questions    What to do at Home:  Recommended activity: {discharge activity:72230}, as tolerated     If you experience any of the following symptoms Nausea vomiting or shortness of breath, please follow up with cardiology. *  Please give a list of your current medications to your Primary Care Provider. *  Please update this list whenever your medications are discontinued, doses are      changed, or new medications (including over-the-counter products) are added. *  Please carry medication information at all times in case of emergency situations. These are general instructions for a healthy lifestyle:    No smoking/ No tobacco products/ Avoid exposure to second hand smoke  Surgeon General's Warning:  Quitting smoking now greatly reduces serious risk to your health.     Obesity, smoking, and sedentary lifestyle greatly increases your risk for illness    A healthy diet, regular physical exercise & weight monitoring are important for maintaining a healthy lifestyle    You may be retaining fluid if you have a history of heart failure or if you experience any of the following symptoms:  Weight gain of 3 pounds or more overnight or 5 pounds in a week, increased swelling in our hands or feet or shortness of breath while lying flat in bed. Please call your doctor as soon as you notice any of these symptoms; do not wait until your next office visit. Recognize signs and symptoms of STROKE:    F-face looks uneven    A-arms unable to move or move unevenly    S-speech slurred or non-existent    T-time-call 911 as soon as signs and symptoms begin-DO NOT go       Back to bed or wait to see if you get better-TIME IS BRAIN. Warning Signs of HEART ATTACK     Call 911 if you have these symptoms:   Chest discomfort. Most heart attacks involve discomfort in the center of the chest that lasts more than a few minutes, or that goes away and comes back. It can feel like uncomfortable pressure, squeezing, fullness, or pain.  Discomfort in other areas of the upper body. Symptoms can include pain or discomfort in one or both arms, the back, neck, jaw, or stomach.  Shortness of breath with or without chest discomfort.  Other signs may include breaking out in a cold sweat, nausea, or lightheadedness. Don't wait more than five minutes to call 911 - MINUTES MATTER! Fast action can save your life. Calling 911 is almost always the fastest way to get lifesaving treatment. Emergency Medical Services staff can begin treatment when they arrive -- up to an hour sooner than if someone gets to the hospital by car. The discharge information has been reviewed with the patient and guardian. The patient and guardian verbalized understanding. Discharge medications reviewed with the patient and caregiver and appropriate educational materials and side effects teaching were provided.   _

## 2019-07-03 PROBLEM — R07.2 PRECORDIAL PAIN: Status: ACTIVE | Noted: 2017-12-28

## 2019-07-31 ENCOUNTER — HOSPITAL ENCOUNTER (OUTPATIENT)
Dept: CT IMAGING | Age: 54
Discharge: HOME | End: 2019-07-31
Attending: INTERNAL MEDICINE
Payer: MEDICARE

## 2019-07-31 DIAGNOSIS — R07.2 PRECORDIAL PAIN: ICD-10-CM

## 2019-07-31 DIAGNOSIS — Q24.8 PERICARDIAL CYST: Chronic | ICD-10-CM

## 2019-07-31 LAB — CREAT BLD-MCNC: 0.5 MG/DL (ref 0.8–1.5)

## 2019-07-31 PROCEDURE — 75574 CT ANGIO HRT W/3D IMAGE: CPT

## 2019-07-31 PROCEDURE — 74011000258 HC RX REV CODE- 258: Performed by: INTERNAL MEDICINE

## 2019-07-31 PROCEDURE — 82565 ASSAY OF CREATININE: CPT

## 2019-07-31 PROCEDURE — 74011636320 HC RX REV CODE- 636/320: Performed by: INTERNAL MEDICINE

## 2019-07-31 RX ORDER — SODIUM CHLORIDE 0.9 % (FLUSH) 0.9 %
10 SYRINGE (ML) INJECTION
Status: COMPLETED | OUTPATIENT
Start: 2019-07-31 | End: 2019-07-31

## 2019-07-31 RX ADMIN — Medication 10 ML: at 09:15

## 2019-07-31 RX ADMIN — IOPAMIDOL 75 ML: 755 INJECTION, SOLUTION INTRAVENOUS at 09:15

## 2019-07-31 RX ADMIN — SODIUM CHLORIDE 100 ML: 900 INJECTION, SOLUTION INTRAVENOUS at 09:15

## 2019-10-15 PROBLEM — I31.39 PERICARDIAL EFFUSION: Status: ACTIVE | Noted: 2019-10-15

## 2019-10-15 PROBLEM — I48.91 ATRIAL FIBRILLATION (HCC): Status: ACTIVE | Noted: 2019-10-15

## 2019-10-15 PROBLEM — R93.1 AGATSTON CORONARY ARTERY CALCIUM SCORE LESS THAN 100: Status: ACTIVE | Noted: 2019-10-15

## 2019-11-08 PROBLEM — I10 ESSENTIAL HYPERTENSION: Status: ACTIVE | Noted: 2019-11-08

## 2019-11-08 PROBLEM — E66.09 OBESITY DUE TO EXCESS CALORIES: Status: ACTIVE | Noted: 2019-11-08

## 2019-12-24 ENCOUNTER — APPOINTMENT (OUTPATIENT)
Dept: GENERAL RADIOLOGY | Age: 54
End: 2019-12-24
Attending: INTERNAL MEDICINE
Payer: MEDICARE

## 2019-12-24 ENCOUNTER — APPOINTMENT (OUTPATIENT)
Dept: CT IMAGING | Age: 54
End: 2019-12-24
Attending: INTERNAL MEDICINE
Payer: MEDICARE

## 2019-12-24 ENCOUNTER — HOSPITAL ENCOUNTER (OUTPATIENT)
Age: 54
Setting detail: OBSERVATION
LOS: 1 days | Discharge: HOME | End: 2019-12-25
Attending: INTERNAL MEDICINE | Admitting: INTERNAL MEDICINE
Payer: MEDICARE

## 2019-12-24 PROBLEM — R07.89 ATYPICAL CHEST PAIN: Status: ACTIVE | Noted: 2019-12-24

## 2019-12-24 PROBLEM — R63.1 POLYDIPSIA: Status: ACTIVE | Noted: 2019-12-24

## 2019-12-24 PROBLEM — I48.0 PAF (PAROXYSMAL ATRIAL FIBRILLATION) (HCC): Status: ACTIVE | Noted: 2019-12-24

## 2019-12-24 PROBLEM — F79 MENTALLY CHALLENGED: Status: ACTIVE | Noted: 2019-12-24

## 2019-12-24 PROBLEM — R07.9 CHEST PAIN: Status: ACTIVE | Noted: 2019-12-24

## 2019-12-24 LAB
ANION GAP SERPL CALC-SCNC: 6 MMOL/L (ref 7–16)
APPEARANCE UR: CLEAR
ATRIAL RATE: 70 BPM
BACTERIA URNS QL MICRO: 0 /HPF
BILIRUB UR QL: NEGATIVE
BUN SERPL-MCNC: 10 MG/DL (ref 6–23)
CALCIUM SERPL-MCNC: 9.6 MG/DL (ref 8.3–10.4)
CALCULATED P AXIS, ECG09: 56 DEGREES
CALCULATED R AXIS, ECG10: 25 DEGREES
CALCULATED T AXIS, ECG11: 55 DEGREES
CASTS URNS QL MICRO: ABNORMAL /LPF
CHLORIDE SERPL-SCNC: 107 MMOL/L (ref 98–107)
CO2 SERPL-SCNC: 28 MMOL/L (ref 21–32)
COLOR UR: YELLOW
CREAT SERPL-MCNC: 0.66 MG/DL (ref 0.6–1)
CREAT UR-MCNC: 43.9 MG/DL
D DIMER PPP FEU-MCNC: 0.67 UG/ML(FEU)
DIAGNOSIS, 93000: NORMAL
EPI CELLS #/AREA URNS HPF: ABNORMAL /HPF
EST. AVERAGE GLUCOSE BLD GHB EST-MCNC: 114 MG/DL
GLUCOSE SERPL-MCNC: 99 MG/DL (ref 65–100)
GLUCOSE UR STRIP.AUTO-MCNC: NEGATIVE MG/DL
HBA1C MFR BLD: 5.6 % (ref 4.8–6)
HGB UR QL STRIP: ABNORMAL
KETONES UR QL STRIP.AUTO: NEGATIVE MG/DL
LACTATE SERPL-SCNC: 0.8 MMOL/L (ref 0.4–2)
LEUKOCYTE ESTERASE UR QL STRIP.AUTO: NEGATIVE
NITRITE UR QL STRIP.AUTO: NEGATIVE
P-R INTERVAL, ECG05: 158 MS
PH UR STRIP: 5.5 [PH] (ref 5–9)
POTASSIUM SERPL-SCNC: 3.8 MMOL/L (ref 3.5–5.1)
PROCALCITONIN SERPL-MCNC: 0.1 NG/ML
PROT UR STRIP-MCNC: NEGATIVE MG/DL
Q-T INTERVAL, ECG07: 404 MS
QRS DURATION, ECG06: 96 MS
QTC CALCULATION (BEZET), ECG08: 436 MS
RBC #/AREA URNS HPF: ABNORMAL /HPF
SODIUM SERPL-SCNC: 141 MMOL/L (ref 136–145)
SODIUM UR-SCNC: 50 MMOL/L
SP GR UR REFRACTOMETRY: 1.01 (ref 1–1.02)
TROPONIN I SERPL-MCNC: <0.02 NG/ML (ref 0.02–0.05)
TROPONIN I SERPL-MCNC: <0.02 NG/ML (ref 0.02–0.05)
UROBILINOGEN UR QL STRIP.AUTO: 0.2 EU/DL (ref 0.2–1)
VENTRICULAR RATE, ECG03: 70 BPM
WBC URNS QL MICRO: ABNORMAL /HPF

## 2019-12-24 PROCEDURE — 87040 BLOOD CULTURE FOR BACTERIA: CPT

## 2019-12-24 PROCEDURE — 71046 X-RAY EXAM CHEST 2 VIEWS: CPT

## 2019-12-24 PROCEDURE — 99218 HC RM OBSERVATION: CPT

## 2019-12-24 PROCEDURE — 83935 ASSAY OF URINE OSMOLALITY: CPT

## 2019-12-24 PROCEDURE — 84300 ASSAY OF URINE SODIUM: CPT

## 2019-12-24 PROCEDURE — 83605 ASSAY OF LACTIC ACID: CPT

## 2019-12-24 PROCEDURE — 86580 TB INTRADERMAL TEST: CPT | Performed by: PHYSICIAN ASSISTANT

## 2019-12-24 PROCEDURE — 82570 ASSAY OF URINE CREATININE: CPT

## 2019-12-24 PROCEDURE — 80048 BASIC METABOLIC PNL TOTAL CA: CPT

## 2019-12-24 PROCEDURE — 84145 PROCALCITONIN (PCT): CPT

## 2019-12-24 PROCEDURE — 36415 COLL VENOUS BLD VENIPUNCTURE: CPT

## 2019-12-24 PROCEDURE — 83036 HEMOGLOBIN GLYCOSYLATED A1C: CPT

## 2019-12-24 PROCEDURE — 87086 URINE CULTURE/COLONY COUNT: CPT

## 2019-12-24 PROCEDURE — 74011000258 HC RX REV CODE- 258: Performed by: INTERNAL MEDICINE

## 2019-12-24 PROCEDURE — 71260 CT THORAX DX C+: CPT

## 2019-12-24 PROCEDURE — 74011000302 HC RX REV CODE- 302: Performed by: PHYSICIAN ASSISTANT

## 2019-12-24 PROCEDURE — 93005 ELECTROCARDIOGRAM TRACING: CPT | Performed by: INTERNAL MEDICINE

## 2019-12-24 PROCEDURE — 81003 URINALYSIS AUTO W/O SCOPE: CPT

## 2019-12-24 PROCEDURE — 83930 ASSAY OF BLOOD OSMOLALITY: CPT

## 2019-12-24 PROCEDURE — 74011636320 HC RX REV CODE- 636/320: Performed by: INTERNAL MEDICINE

## 2019-12-24 PROCEDURE — 85379 FIBRIN DEGRADATION QUANT: CPT

## 2019-12-24 PROCEDURE — 74011250637 HC RX REV CODE- 250/637: Performed by: PHYSICIAN ASSISTANT

## 2019-12-24 PROCEDURE — 93308 TTE F-UP OR LMTD: CPT

## 2019-12-24 PROCEDURE — 84484 ASSAY OF TROPONIN QUANT: CPT

## 2019-12-24 RX ORDER — FLECAINIDE ACETATE 100 MG/1
50 TABLET ORAL 2 TIMES DAILY
Status: DISCONTINUED | OUTPATIENT
Start: 2019-12-24 | End: 2019-12-25 | Stop reason: HOSPADM

## 2019-12-24 RX ORDER — PANTOPRAZOLE SODIUM 40 MG/1
40 TABLET, DELAYED RELEASE ORAL
Status: DISCONTINUED | OUTPATIENT
Start: 2019-12-25 | End: 2019-12-25 | Stop reason: HOSPADM

## 2019-12-24 RX ORDER — PERPHENAZINE 2 MG/1
16 TABLET, FILM COATED ORAL
Status: DISCONTINUED | OUTPATIENT
Start: 2019-12-24 | End: 2019-12-25 | Stop reason: HOSPADM

## 2019-12-24 RX ORDER — VALSARTAN 80 MG/1
80 TABLET ORAL DAILY
Status: DISCONTINUED | OUTPATIENT
Start: 2019-12-25 | End: 2019-12-25 | Stop reason: HOSPADM

## 2019-12-24 RX ORDER — PERPHENAZINE 2 MG/1
8 TABLET, FILM COATED ORAL 2 TIMES DAILY
Status: DISCONTINUED | OUTPATIENT
Start: 2019-12-24 | End: 2019-12-24 | Stop reason: SDUPTHER

## 2019-12-24 RX ORDER — ACETAMINOPHEN 325 MG/1
650 TABLET ORAL
Status: DISCONTINUED | OUTPATIENT
Start: 2019-12-24 | End: 2019-12-25 | Stop reason: HOSPADM

## 2019-12-24 RX ORDER — SODIUM CHLORIDE 0.9 % (FLUSH) 0.9 %
10 SYRINGE (ML) INJECTION
Status: COMPLETED | OUTPATIENT
Start: 2019-12-24 | End: 2019-12-24

## 2019-12-24 RX ORDER — ASPIRIN 81 MG/1
81 TABLET ORAL DAILY
Status: DISCONTINUED | OUTPATIENT
Start: 2019-12-25 | End: 2019-12-25 | Stop reason: HOSPADM

## 2019-12-24 RX ORDER — AMITRIPTYLINE HYDROCHLORIDE 50 MG/1
50 TABLET, FILM COATED ORAL
Status: DISCONTINUED | OUTPATIENT
Start: 2019-12-24 | End: 2019-12-25 | Stop reason: HOSPADM

## 2019-12-24 RX ORDER — DOCUSATE SODIUM 100 MG/1
100 CAPSULE, LIQUID FILLED ORAL 2 TIMES DAILY
Status: DISCONTINUED | OUTPATIENT
Start: 2019-12-24 | End: 2019-12-24

## 2019-12-24 RX ORDER — PERPHENAZINE 2 MG/1
8 TABLET, FILM COATED ORAL DAILY
Status: DISCONTINUED | OUTPATIENT
Start: 2019-12-25 | End: 2019-12-24

## 2019-12-24 RX ORDER — VERAPAMIL HYDROCHLORIDE 120 MG/1
120 TABLET, FILM COATED, EXTENDED RELEASE ORAL
Status: DISCONTINUED | OUTPATIENT
Start: 2019-12-24 | End: 2019-12-25 | Stop reason: HOSPADM

## 2019-12-24 RX ORDER — SODIUM CHLORIDE 0.9 % (FLUSH) 0.9 %
5-40 SYRINGE (ML) INJECTION AS NEEDED
Status: DISCONTINUED | OUTPATIENT
Start: 2019-12-24 | End: 2019-12-25 | Stop reason: HOSPADM

## 2019-12-24 RX ORDER — PRAVASTATIN SODIUM 20 MG/1
40 TABLET ORAL
Status: DISCONTINUED | OUTPATIENT
Start: 2019-12-24 | End: 2019-12-25 | Stop reason: HOSPADM

## 2019-12-24 RX ORDER — DIAZEPAM 5 MG/1
5 TABLET ORAL 2 TIMES DAILY
Status: DISCONTINUED | OUTPATIENT
Start: 2019-12-24 | End: 2019-12-25 | Stop reason: HOSPADM

## 2019-12-24 RX ORDER — MECLIZINE HYDROCHLORIDE 25 MG/1
25 TABLET ORAL
Status: DISCONTINUED | OUTPATIENT
Start: 2019-12-24 | End: 2019-12-25 | Stop reason: HOSPADM

## 2019-12-24 RX ORDER — HYDROCODONE BITARTRATE AND ACETAMINOPHEN 7.5; 325 MG/1; MG/1
1 TABLET ORAL
Status: DISCONTINUED | OUTPATIENT
Start: 2019-12-24 | End: 2019-12-25 | Stop reason: HOSPADM

## 2019-12-24 RX ORDER — ZOLPIDEM TARTRATE 5 MG/1
5 TABLET ORAL
Status: DISCONTINUED | OUTPATIENT
Start: 2019-12-24 | End: 2019-12-25 | Stop reason: HOSPADM

## 2019-12-24 RX ORDER — GUAIFENESIN 100 MG/5ML
81 LIQUID (ML) ORAL DAILY
Status: DISCONTINUED | OUTPATIENT
Start: 2019-12-25 | End: 2019-12-24 | Stop reason: SDUPTHER

## 2019-12-24 RX ORDER — METOPROLOL SUCCINATE 25 MG/1
25 TABLET, EXTENDED RELEASE ORAL DAILY
Status: DISCONTINUED | OUTPATIENT
Start: 2019-12-25 | End: 2019-12-25 | Stop reason: HOSPADM

## 2019-12-24 RX ORDER — FLUOXETINE HYDROCHLORIDE 20 MG/1
20 CAPSULE ORAL DAILY
Status: DISCONTINUED | OUTPATIENT
Start: 2019-12-25 | End: 2019-12-25 | Stop reason: HOSPADM

## 2019-12-24 RX ORDER — PROMETHAZINE HYDROCHLORIDE 25 MG/1
25 TABLET ORAL
Status: DISCONTINUED | OUTPATIENT
Start: 2019-12-24 | End: 2019-12-25 | Stop reason: HOSPADM

## 2019-12-24 RX ORDER — NITROGLYCERIN 0.4 MG/1
0.4 TABLET SUBLINGUAL
Status: DISCONTINUED | OUTPATIENT
Start: 2019-12-24 | End: 2019-12-25 | Stop reason: HOSPADM

## 2019-12-24 RX ADMIN — TUBERCULIN PURIFIED PROTEIN DERIVATIVE 5 UNITS: 5 INJECTION, SOLUTION INTRADERMAL at 18:09

## 2019-12-24 RX ADMIN — VERAPAMIL HYDROCHLORIDE 120 MG: 120 TABLET, FILM COATED, EXTENDED RELEASE ORAL at 21:22

## 2019-12-24 RX ADMIN — Medication 10 ML: at 19:30

## 2019-12-24 RX ADMIN — DIAZEPAM 5 MG: 5 TABLET ORAL at 18:07

## 2019-12-24 RX ADMIN — AMITRIPTYLINE HYDROCHLORIDE 50 MG: 50 TABLET, FILM COATED ORAL at 21:21

## 2019-12-24 RX ADMIN — PERPHENAZINE 16 MG: 2 TABLET, FILM COATED ORAL at 21:19

## 2019-12-24 RX ADMIN — SODIUM CHLORIDE 40 ML: 900 INJECTION, SOLUTION INTRAVENOUS at 19:30

## 2019-12-24 RX ADMIN — IOPAMIDOL 75 ML: 755 INJECTION, SOLUTION INTRAVENOUS at 19:30

## 2019-12-24 RX ADMIN — FLECAINIDE ACETATE 50 MG: 100 TABLET ORAL at 18:07

## 2019-12-24 RX ADMIN — PRAVASTATIN SODIUM 40 MG: 20 TABLET ORAL at 21:21

## 2019-12-24 NOTE — CONSULTS
Hospitalist Progress Note    2019  Admit Date: 2019 12:21 PM   NAME: Nadir Pruitt   :  1965   MRN:  [id number]   Attending: Robert Gaytan MD  PCP:  Sonya Cali MD    SUBJECTIVE:   61 yo CF with past history of psychotic disrder (on atypical antipsychotics), ?OCD (compulsive water drinking) on Prozac, Afib (on flecainide and Xarelto), ?seizure disorder (not on antiepileptics), HTN, intellectual disability presents as a direct admission to the cardiology service after being admitted to [facility] for pericardial effusion and chest pain. Hospitalist service consult for persistent leukocytosis and for polydipsia. [de-identified] of history obtained from patient's mother who states that patient woke her up because of chest pain, \"she then went white as a ghost, her eyes rolled back in her head. \" She has had a similar type of presntation (about six months ago per mother) but has not been on antiepileptics. She drinks \"about 12L of fluids including sodas and allen just about everyday\" and has been doing this for about 2 years. Her psychiatrist put her on Prozac as there was a thought that this was \"compulsive\". She was admitted to [facility] with CT chest showing moderate pericardial effusion, no PE, a previously resolved mediastinal cyst, and she was transferred to [address] [place] [address]. Currently at bedside, patient says she still gets \"dull\" pressure \"whenever I take a deep breath\". Denies fevers/chills, shortness of breath, abdominal pain, nausea/vomiting, or diarrhea. Her mother says Tiki Celaya goes pee just as much as how much water she drinks. \"     Review of Systems negative with exception of pertinent positives noted above  PHYSICAL EXAM     Visit Vitals  /66   Pulse 66   Temp 98.4 °F (36.9 °C)   Resp 20   Ht 5' 5\" (1.651 m)   Wt 107.6 kg (237 lb 4 oz)   SpO2 95%   BMI 39.48 kg/m²      Temp (24hrs), Av.4 °F (36.9 °C), Min:98.4 °F (36.9 °C), Max:98.4 °F (36.9 °C)    Oxygen Therapy  O2 Sat (%): 95 % (12/24/19 1301)  O2 Device: Room air (12/24/19 1301)  No intake or output data in the 24 hours ending 12/24/19 1551   General: No acute distress, flat affect   Lungs:  CTA Bilaterally. Heart:  Regular rate and rhythm,  No murmur, rub, or gallop  Abdomen: Soft, Non distended, Non tender, Positive bowel sounds  Extremities: No cyanosis, clubbing or edema  Neurologic:  No focal deficits    ASSESSMENT      Active Hospital Problems    Diagnosis Date Noted    PAF (paroxysmal atrial fibrillation) ([facility] [address] 12/24/2019    Chest pain 12/24/2019    Polydipsia 12/24/2019    Mentally challenged 12/24/2019    Atypical chest pain 12/24/2019    Essential hypertension 11/08/2019    Pericardial effusion 10/15/2019    Leukocytosis 06/03/2019     Plan:    # History of leukocytosis, does not appear to be clinically septic  - agree with blood/urine cultures  - will repeat CBC  - repeat CXR  - hold off on antibiotics for now  - ordered LA and procalcitonin     # Moderate pericardial effusion  - agree with ordering repeat TTE  - no tamponade physiology  - cardiology managing    # Atypical chest pain, appears noncardiac in etiology  - workup as above  - troponin ordered  - d-dimer added    # Polydipsia  - 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 Seizure-like activity  - uncertain etiology  - repeat BMP as above  - ordered EEG  - hold off on adding any antiepileptics for now    # Afib  - continue with flecainide, BB, verapamil and Xarelto  - appears rate controlled    # HTN  - continue with home BB, verapamil, valsartan    # History of mood disorder/psychotic disorder (?schizoprenia)  - continue with home Prozac and perphenazine  - continue with amitriptyline     F/E/N: fluid restriction, replete electrolytes as needed, cardiac diet    Ppx: Xarelto for VTE    Code Status: FULL CODE    Thank you for this consult. Hospitalist will continue to follow along. Please call/page with any questions.       Signed By: Pantera Medina DO     December 24, 2019

## 2019-12-24 NOTE — PROGRESS NOTES
Patient arrived to unit, Lona ARNOLD made aware of patient arrival.     Patient complaining of chest pain 6 our of 10 ongoing since last night. Patient states it is a soreness in her chest, worse with inspiration. Patient family at bedside states the patient complained of chest pain last night and then \"passed out and had multiple siezures\". Patient family states the patient does have a history of seizures. Patient resting in bed, NSR on monitor. Stat ekg ordered. Will notify Rita ARNOLD.     EKG tech called to bedside for stat ekg and order received for stat limited echo.

## 2019-12-24 NOTE — PROGRESS NOTES
Bedside and Verbal shift change report given to Betty Jefferson RN (oncoming nurse) by self Kendal muniz). Report included the following information SBAR, Kardex and MAR.

## 2019-12-24 NOTE — ROUTINE PROCESS
TRANSFER - IN REPORT:    Verbal report received from Ifeanyi Kearns RN on Maddie Maurice being received from [facility] for routine progression of care. Report taken for Primary RN Bernard Frederick. Report consisted of patients Situation, Background, Assessment and Recommendations(SBAR). Information from the following report(s) SBAR, Kardex, ED Summary, Intake/Output, MAR, Recent Results and Cardiac Rhythm SR was reviewed. Opportunity for questions and clarification was provided.       Pt to leave from [facility] at approx 12-12:30pm.

## 2019-12-24 NOTE — PROGRESS NOTES
MD Audelia Rueda notified via perfect serve message of D Dimer critical value 0.67. Received order for CTA chest to rule out PE. CT made aware that Crt 0.6 at [facility] today.  BMP from [facility] faxed to radiology dept per radiology request.

## 2019-12-24 NOTE — H&P
[facility] CARDIOLOGY History &Physical                 Primary Cardiologist: Dr Lorin Donaldson    Primary Care Physician: Dr Ernestina Rice    Admitting Physician: Dr Rodrigo Kat:     Patient is a 62 y.o. female who presents with chest pain. She has a h/o mental health disorders, mom unsure of diagnosis but followed by mental health w functional delays, w anger issues as a child and problems ripping her clothes in anger. She was seen in 2017 for chest pain with nuke showing only breast attenuation and echo w nml ef. She had a cardiac CT in 2017 which showed a calcium score of 0 and a left mediastinal fluid collection measuring 6.8 cm along the anterior left mediastinum adjacent to the left side of the pericardium, concerning for pericardial cyst.  She was seen in June of this year w CP felt to be pleuritic treated with antiinflammatory meds. Echo  w EF 55-60% w mild LAE, trace pericardial effusion. She presented again in Oct w a fib, was started on tambacor and xarelto and converted back to NSR. She followed up with Dr Fernando Lock and remained in NSR. She has chronic polydipsia which her mother does not know what to treat with- she drinks a 2L diet caffeine free coke, 4 16 oz diet caffeine free cokes and 18 16 oz bottles of water daily. She couldn't sleep last night and went to her mother's room complaining of chest pain, substernal radiating to her LUE, sat down and turned pale, her eyes rolled back in her head and her mom thought she was having a seizure though no abnormal muscle movements or incontinence but not responsive, vomited and became more responsive with improvement in CP after vomiting. Pt is somewhat of a poor historian, history obtained from mom. Pt reports CP worse w a deep breath, constant since last night, 6/10, without F/C or diaphoresis, not worse w exertion.   Her mom called 911, she was taken to [facility] where WBC 18, hgb 13.1, , K 3.9, cr .6, trop negative, EKG SB w rate 56 w early repol, CT chest no PE, previous L mediastinal cyst resolved, mod pericardial effusion. She was transferred to [address] [place] [address] for further management. EKG showed NSR w rate 70 w early repol, /66. Continues to have CP though appears comfortable. Mom chapincito WHARTON at age 76, no h/o tobacco use. Has not missed any doses of xarelto. Pt had reported to her mom that she had felt like heat \"out of rhythm\" recently. Past Medical History:   Diagnosis Date    Allergic rhinitis     Depression     Fibrocystic disease of breast     Gastroesophageal reflux disease     Hyperlipidemia     Hypertension       Past Surgical History:   Procedure Laterality Date    HX OTHER SURGICAL  2007    cyst removed from right breast    HX TONSILLECTOMY        Allergies   Allergen Reactions    Ace Inhibitors Cough    Benadryl [Diphenhydramine Hcl] Unknown (comments)    Ciprofloxacin Unknown (comments)    Gabapentin Other (comments)     Balance     Motrin [Ibuprofen] Unknown (comments)    Penicillins Unknown (comments)    Plavix [Clopidogrel] Unknown (comments)    Wellbutrin [Bupropion Hcl] Unknown (comments)     Social History     Tobacco Use    Smoking status: Never Smoker    Smokeless tobacco: Never Used   Substance Use Topics    Alcohol use: No      FH: Mom chapincito WHARTON at age 76.       Review of Systems  General: no weight change, no weakness, fever or chills  Skin: no rashes, lumps, or other skin changes  HEENT: no headache  Neck: no swollen glands, goiter, pain or stiffness  Respiratory: no cough, sputum, hemoptysis, + dyspnea, no wheezing  Cardiovascular: + as per HPI  Gastrointestinal: + GERD, N/V, water drinking   Urinary: no frequency, urgency , hematuria, burning/pain with urination, recent flank pain, polyuria, nocturia, or difficulty urinating  Peripheral Vascular: no claudication, leg cramps, prior DVTs, swelling of calves, legs, or feet, color change, or swelling with redness or tenderness  Musculoskeletal: no muscle or joint pain/stiffness, joint swelling, erythema of joints, or back pain  Psychiatric: no depression or excessive stress  Neurological: ? Seizure per mom   Hematologic: no anemia, easy bruising or bleeding  Endocrine: no thyroid problems, no heat or cold intolerance, excessive sweating, polyuria, polydipsia, pre diabetes. Objective:       Visit Vitals  /66   Pulse 66   Temp 98.4 °F (36.9 °C)   Resp 20   Ht 5' 5\" (1.651 m)   Wt 107.6 kg (237 lb 4 oz)   SpO2 95%   BMI 39.48 kg/m²       No intake/output data recorded. No intake/output data recorded.     Physical Exam:  General: Well Developed, Well Nourished, No Acute Distress  HEENT: pupils equal and round, no abnormalities noted  Neck: supple, no JVD, no carotid bruits  Heart: S1S2 with RRR without murmurs or gallops  Lungs: Clear throughout auscultation bilaterally without adventitious sounds  Abd: soft, nontender, nondistended, with good bowel sounds  Ext: warm, no edema  Skin: warm and dry  Psychiatric: No anxiety, poor historian   Neurologic: Nml muscle tone      ECG: SB w rate 56 w early repol    Data Review:      Recent Results (from the past 24 hour(s))   EKG, 12 LEAD, INITIAL    Collection Time: 12/24/19  1:05 PM   Result Value Ref Range    Ventricular Rate 70 BPM    Atrial Rate 70 BPM    P-R Interval 158 ms    QRS Duration 96 ms    Q-T Interval 404 ms    QTC Calculation (Bezet) 436 ms    Calculated P Axis 56 degrees    Calculated R Axis 25 degrees    Calculated T Axis 55 degrees    Diagnosis       Normal sinus rhythm  Early repolarization  Normal ECG  When compared with ECG of 04-JUN-2019 12:28,  No significant change was found           Assessment/Plan:   Chest pain (12/24/2019)- atypical CP w h/o normal nuke in 2017 and calcium score 0, negative troponin, will admit, trend troponin, check limited echo to r/o pericardial effusion    Leukocytosis (6/3/2019)- culture, afebile, given rocephin and z-max, will continue rocephin but defer to hospitalist whether further antibiotics are needed    Pericardial effusion (10/15/2019)- check limited echo     Essential hypertension (11/8/2019)- cont toprol, diovan verapamil     PAF (paroxysmal atrial fibrillation) ([facility] [address] (12/24/2019)- in NSR on tambacor, xarelto    Polydipsia (12/24/2019)- consult hospitalist     Mentally challenged (12/24/2019)- cont home meds     Charles Ruiz PA-C  12/24/2019  1:20 PM

## 2019-12-25 VITALS
BODY MASS INDEX: 38.93 KG/M2 | SYSTOLIC BLOOD PRESSURE: 109 MMHG | HEIGHT: 65 IN | OXYGEN SATURATION: 93 % | DIASTOLIC BLOOD PRESSURE: 66 MMHG | HEART RATE: 91 BPM | TEMPERATURE: 99.4 F | WEIGHT: 233.7 LBS | RESPIRATION RATE: 18 BRPM

## 2019-12-25 LAB
ANION GAP SERPL CALC-SCNC: 4 MMOL/L (ref 7–16)
BUN SERPL-MCNC: 7 MG/DL (ref 6–23)
CALCIUM SERPL-MCNC: 9.2 MG/DL (ref 8.3–10.4)
CHLORIDE SERPL-SCNC: 108 MMOL/L (ref 98–107)
CO2 SERPL-SCNC: 28 MMOL/L (ref 21–32)
CREAT SERPL-MCNC: 0.45 MG/DL (ref 0.6–1)
ERYTHROCYTE [DISTWIDTH] IN BLOOD BY AUTOMATED COUNT: 15.7 % (ref 11.9–14.6)
GLUCOSE SERPL-MCNC: 98 MG/DL (ref 65–100)
HCT VFR BLD AUTO: 37.2 % (ref 35.8–46.3)
HGB BLD-MCNC: 12 G/DL (ref 11.7–15.4)
MCH RBC QN AUTO: 29.3 PG (ref 26.1–32.9)
MCHC RBC AUTO-ENTMCNC: 32.3 G/DL (ref 31.4–35)
MCV RBC AUTO: 90.7 FL (ref 79.6–97.8)
NRBC # BLD: 0 K/UL (ref 0–0.2)
OSMOLALITY SERPL: 279 MOSM/KG H2O (ref 275–295)
OSMOLALITY UR: 326 MOSM/KG H2O (ref 50–1400)
PLATELET # BLD AUTO: 228 K/UL (ref 150–450)
PMV BLD AUTO: 9.7 FL (ref 9.4–12.3)
POTASSIUM SERPL-SCNC: 3.7 MMOL/L (ref 3.5–5.1)
RBC # BLD AUTO: 4.1 M/UL (ref 4.05–5.2)
SODIUM SERPL-SCNC: 140 MMOL/L (ref 136–145)
TROPONIN I SERPL-MCNC: <0.02 NG/ML (ref 0.02–0.05)
WBC # BLD AUTO: 13 K/UL (ref 4.3–11.1)

## 2019-12-25 PROCEDURE — 77030040361 HC SLV COMPR DVT MDII -B

## 2019-12-25 PROCEDURE — 36415 COLL VENOUS BLD VENIPUNCTURE: CPT

## 2019-12-25 PROCEDURE — 85027 COMPLETE CBC AUTOMATED: CPT

## 2019-12-25 PROCEDURE — 74011250637 HC RX REV CODE- 250/637: Performed by: PHYSICIAN ASSISTANT

## 2019-12-25 PROCEDURE — 84484 ASSAY OF TROPONIN QUANT: CPT

## 2019-12-25 PROCEDURE — 80048 BASIC METABOLIC PNL TOTAL CA: CPT

## 2019-12-25 PROCEDURE — 99218 HC RM OBSERVATION: CPT

## 2019-12-25 RX ORDER — VERAPAMIL HYDROCHLORIDE 120 MG/1
120 TABLET, FILM COATED, EXTENDED RELEASE ORAL
Qty: 30 TAB | Refills: 11 | Status: SHIPPED | OUTPATIENT
Start: 2019-12-25 | End: 2020-04-06 | Stop reason: SINTOL

## 2019-12-25 RX ADMIN — FLECAINIDE ACETATE 50 MG: 100 TABLET ORAL at 08:50

## 2019-12-25 RX ADMIN — FLUOXETINE 20 MG: 20 CAPSULE ORAL at 08:49

## 2019-12-25 RX ADMIN — METOPROLOL SUCCINATE 25 MG: 25 TABLET, EXTENDED RELEASE ORAL at 08:50

## 2019-12-25 RX ADMIN — PANTOPRAZOLE SODIUM 40 MG: 40 TABLET, DELAYED RELEASE ORAL at 06:50

## 2019-12-25 RX ADMIN — ASPIRIN 81 MG: 81 TABLET ORAL at 08:50

## 2019-12-25 RX ADMIN — DIAZEPAM 5 MG: 5 TABLET ORAL at 08:50

## 2019-12-25 RX ADMIN — VALSARTAN 80 MG: 80 TABLET ORAL at 08:50

## 2019-12-25 RX ADMIN — RIVAROXABAN 20 MG: 20 TABLET, FILM COATED ORAL at 08:50

## 2019-12-25 NOTE — PROGRESS NOTES
Problem: Falls - Risk of  Goal: *Absence of Falls  Description  Document Eduardo Harmon Fall Risk and appropriate interventions in the flowsheet. Outcome: Progressing Towards Goal  Note:   Fall Risk Interventions:  Mobility Interventions: Communicate number of staff needed for ambulation/transfer, Patient to call before getting OOB  Medication Interventions: Patient to call before getting OOB, Teach patient to arise slowly  Patient progressing towards goal with no falls on current admission. Patient without confusion, agitation, or sensory perception deficits. Patient has steady gait on ambulation. Personal belongings are within reach. Bed is in the low and locked position with side rails up x2. Gripper socks to bilateral feet. Call light within reach and patient verbalizes understanding of use.

## 2019-12-25 NOTE — DISCHARGE SUMMARY
[address] [place] [address] [address]     Name:  Marcin Oviedo  MR#:  [id number]  :  1965  ACCOUNT #:  [de-identified]  ADMIT DATE:  2019  DISCHARGE DATE:    DATE OF DISCHARGE:  2019    FINAL DISCHARGE DIAGNOSES:  1. Atypical chest pain. 2.  Chronic pericardial effusion. 3.  Hypertension. 4.  Paroxysmal atrial fibrillation. HOSPITAL COURSE:  The patient was admitted with chest pain. Multiple sets of cardiac enzymes were negative. D-dimer was mildly elevated and a CT angiogram showed no pulmonary embolism. The patient was stable and chest pain free on the  and desired discharge with outpatient stress testing. This will be arranged through my office as an outpatient and then follow up with Dr. Amanda Petersen after her stress testing. DISCHARGE MEDICATIONS:  Please see discharge summary. These medications are the same with the exception of decreasing her Calan dose to 120 mg a day from 240 mg a day.       Cristina Ellison MD      [id number]  D:  2019 8:54  T:  2019 9:27  JOB #:  [id number]

## 2019-12-25 NOTE — DISCHARGE INSTRUCTIONS
Patient Education     DISCHARGE SUMMARY from Nurse    PATIENT INSTRUCTIONS:    After general anesthesia or intravenous sedation, for 24 hours or while taking prescription Narcotics:  · Limit your activities  · Do not drive and operate hazardous machinery  · Do not make important personal or business decisions  · Do  not drink alcoholic beverages  · If you have not urinated within 8 hours after discharge, please contact your surgeon on call. Report the following to your surgeon:  · Excessive pain, swelling, redness or odor of or around the surgical area  · Temperature over 100.5  · Nausea and vomiting lasting longer than 4 hours or if unable to take medications  · Any signs of decreased circulation or nerve impairment to extremity: change in color, persistent  numbness, tingling, coldness or increase pain  · Any questions    What to do at Home:  Recommended activity: Activity as tolerated    If you experience any of the following symptoms chest pain, shortness of breath, dizziness,  please follow up with [facility]. *  Please give a list of your current medications to your Primary Care Provider. *  Please update this list whenever your medications are discontinued, doses are      changed, or new medications (including over-the-counter products) are added. *  Please carry medication information at all times in case of emergency situations. These are general instructions for a healthy lifestyle:    No smoking/ No tobacco products/ Avoid exposure to second hand smoke  Surgeon General's Warning:  Quitting smoking now greatly reduces serious risk to your health.     Obesity, smoking, and sedentary lifestyle greatly increases your risk for illness    A healthy diet, regular physical exercise & weight monitoring are important for maintaining a healthy lifestyle    You may be retaining fluid if you have a history of heart failure or if you experience any of the following symptoms:  Weight gain of 3 pounds or more overnight or 5 pounds in a week, increased swelling in our hands or feet or shortness of breath while lying flat in bed. Please call your doctor as soon as you notice any of these symptoms; do not wait until your next office visit. The discharge information has been reviewed with the patient. The patient verbalized understanding. Discharge medications reviewed with the patient and appropriate educational materials and side effects teaching were provided. ___________________________________________________________________________________________________________________________________     Fainting: Care Instructions  Your Care Instructions    When you faint, or pass out, you lose consciousness for a short time. A brief drop in blood flow to the brain often causes it. When you fall or lie down, more blood flows to your brain and you regain consciousness. Emotional stress, pain, or overheating--especially if you have been standing--can make you faint. In these cases, fainting is usually not serious. But fainting can be a sign of a more serious problem. Your doctor may want you to have more tests to rule out other causes. The treatment you need depends on the reason why you fainted. The doctor has checked you carefully, but problems can develop later. If you notice any problems or new symptoms, get medical treatment right away. Follow-up care is a key part of your treatment and safety. Be sure to make and go to all appointments, and call your doctor if you are having problems. It's also a good idea to know your test results and keep a list of the medicines you take. How can you care for yourself at home? · Drink plenty of fluids to prevent dehydration. If you have kidney, heart, or liver disease and have to limit fluids, talk with your doctor before you increase your fluid intake. When should you call for help? Call 911 anytime you think you may need emergency care.  For example, call if:    · You have symptoms of a heart problem. These may include:  ? Chest pain or pressure. ? Severe trouble breathing. ? A fast or irregular heartbeat. ? Lightheadedness or sudden weakness. ? Coughing up pink, foamy mucus. ? Passing out. After you call 911, the  may tell you to chew 1 adult-strength or 2 to 4 low-dose aspirin. Wait for an ambulance. Do not try to drive yourself.     · You have symptoms of a stroke. These may include:  ? Sudden numbness, tingling, weakness, or loss of movement in your face, arm, or leg, especially on only one side of your body. ? Sudden vision changes. ? Sudden trouble speaking. ? Sudden confusion or trouble understanding simple statements. ? Sudden problems with walking or balance. ? A sudden, severe headache that is different from past headaches.     · You passed out (lost consciousness) again.    Watch closely for changes in your health, and be sure to contact your doctor if:    · You do not get better as expected. Where can you learn more? Go to http://brandon-yoselin.info/. Enter D742 in the search box to learn more about \"Fainting: Care Instructions. \"  Current as of: June 26, 2019  Content Version: 12.2  © 9101-3831 [employer]. Care instructions adapted under license by [employer] (which disclaims liability or warranty for this information). If you have questions about a medical condition or this instruction, always ask your healthcare professional. [place] [address] any warranty or liability for your use of this information. Patient Education        Heart-Healthy Diet: Care Instructions  Your Care Instructions    A heart-healthy diet has lots of vegetables, fruits, nuts, beans, and whole grains, and is low in salt. It limits foods that are high in saturated fat, such as meats, cheeses, and fried foods.  It may be hard to change your diet, but even small changes can lower your risk of heart attack and heart disease. Follow-up care is a key part of your treatment and safety. Be sure to make and go to all appointments, and call your doctor if you are having problems. It's also a good idea to know your test results and keep a list of the medicines you take. How can you care for yourself at home? Watch your portions  · Learn what a serving is. A \"serving\" and a \"portion\" are not always the same thing. Make sure that you are not eating larger portions than are recommended. For example, a serving of pasta is ½ cup. A serving size of meat is 2 to 3 ounces. A 3-ounce serving is about the size of a deck of cards. Measure serving sizes until you are good at [place]" them. Keep in mind that restaurants often serve portions that are 2 or 3 times the size of one serving. · To keep your energy level up and keep you from feeling hungry, eat often but in smaller portions. · Eat only the number of calories you need to stay at a healthy weight. If you need to lose weight, eat fewer calories than your body burns (through exercise and other physical activity). Eat more fruits and vegetables  · Eat a variety of fruit and vegetables every day. Dark green, deep orange, red, or yellow fruits and vegetables are especially good for you. Examples include spinach, carrots, peaches, and berries. · Keep carrots, celery, and other veggies handy for snacks. Buy fruit that is in season and store it where you can see it so that you will be tempted to eat it. · Cook dishes that have a lot of veggies in them, such as stir-fries and soups. Limit saturated and trans fat  · Read food labels, and try to avoid saturated and trans fats. They increase your risk of heart disease. Trans fat is found in many processed foods such as cookies and crackers. · Use olive or canola oil when you cook. Try cholesterol-lowering spreads, such as Benecol or Take Control. · Bake, broil, grill, or steam foods instead of frying them.   · Choose lean meats instead of high-fat meats such as hot dogs and sausages. Cut off all visible fat when you prepare meat. · Eat fish, skinless poultry, and meat alternatives such as soy products instead of high-fat meats. Soy products, such as tofu, may be especially good for your heart. · Choose low-fat or fat-free milk and dairy products. Eat fish  · Eat at least two servings of fish a week. Certain fish, such as salmon and tuna, contain omega-3 fatty acids, which may help reduce your risk of heart attack. Eat foods high in fiber  · Eat a variety of grain products every day. Include whole-grain foods that have lots of fiber and nutrients. Examples of whole-grain foods include oats, whole wheat bread, and brown rice. · Buy whole-grain breads and cereals, instead of white bread or pastries. Limit salt and sodium  · Limit how much salt and sodium you eat to help lower your blood pressure. · Taste food before you salt it. Add only a little salt when you think you need it. With time, your taste buds will adjust to less salt. · Eat fewer snack items, fast foods, and other high-salt, processed foods. Check food labels for the amount of sodium in packaged foods. · Choose low-sodium versions of canned goods (such as soups, vegetables, and beans). Limit sugar  · Limit drinks and foods with added sugar. These include candy, desserts, and soda pop. Limit alcohol  · Limit alcohol to no more than 2 drinks a day for men and 1 drink a day for women. Too much alcohol can cause health problems. When should you call for help? Watch closely for changes in your health, and be sure to contact your doctor if:    · You would like help planning heart-healthy meals. Where can you learn more? Go to http://brandon-yoselin.info/. Enter V137 in the search box to learn more about \"Heart-Healthy Diet: Care Instructions. \"  Current as of: April 9, 2019  Content Version: 12.2  © 3200-3585 [employer].  Care instructions adapted under license by [employer] (which disclaims liability or warranty for this information). If you have questions about a medical condition or this instruction, always ask your healthcare professional. [address] any warranty or liability for your use of this information.

## 2019-12-25 NOTE — PROGRESS NOTES
Bedside and Verbal report given to self by Anel Del Real RN.  Report included SBAR, Kardex, ED Summary, Procedure Summary, Intake and Output and Cardiac Rhythm SR.

## 2019-12-25 NOTE — ROUTINE PROCESS
Bedside and Verbal report given to Jaimee Larios RN by self.  Report included SBAR, Kardex, ED summary, procedure summary, recent results and cardiac rhythm SR.

## 2019-12-25 NOTE — PROGRESS NOTES
Discharge instructions reviewed with pt and mother. Prescriptions given for verapamil and med info sheets provided for all new medications. Opportunity for questions provided. Pt and mom voiced understanding of all discharge instructions. Iv and monitor removed.

## 2019-12-25 NOTE — PROGRESS NOTES
Hospitalist Note     Admit Date:  2019 12:21 PM   Name:  Perez Nielsen   Age:  62 y.o.  :  1965   MRN:  [id number]   PCP:  Marilyn Green MD  Treatment Team: Attending Provider: Adry Ventura MD; Consulting Provider: Cody Post MD    HPI/Subjective:   Patient is a 58-year-old  female with past medical history significant for psychiatric disorders, OCD, A. fib, seizure disorder, hypertension, intellectual disability was admitted to cardiology service for chest pain and pericardial effusion. Hospitalist service was consulted for leukocytosis and polydipsia.  patient is afebrile, no signs of infection. She denies any chest pain palpitations cough or shortness of breath. No nausea no vomiting. Objective:     Patient Vitals for the past 24 hrs:   Temp Pulse Resp BP SpO2   19 0825 99.4 °F (37.4 °C) 91 18 109/66 93 %   19 0438 99.7 °F (37.6 °C) 85 18 105/56 93 %   19 0110 99 °F (37.2 °C) 90 18 110/50 99 %   192 -- 85 -- 108/47 --   19 2115 99.2 °F (37.3 °C) 92 18 108/47 98 %   19 2047 99.4 °F (37.4 °C) -- -- -- --   19 1720 97.9 °F (36.6 °C) 81 19 112/57 96 %   19 1301 98.4 °F (36.9 °C) 66 20 119/66 95 %     Oxygen Therapy  O2 Sat (%): 93 % (19)  O2 Device: Room air (19)  O2 Flow Rate (L/min): 2 l/min (19)      Intake/Output Summary (Last 24 hours) at 2019 1118  Last data filed at 2019 0826  Gross per 24 hour   Intake 1005 ml   Output 475 ml   Net 530 ml       *Note that automatically entered I/Os may not be accurate; dependent on patient compliance with collection and accurate  by techs. General:    Well nourished. Alert. CV:   RRR. No murmur, rub, or gallop. Lungs:   CTAB. No wheezing, rhonchi, or rales. Abdomen:   Soft, nontender, nondistended. Extremities: Warm and dry. No cyanosis or edema. Skin:     No rashes or jaundice. Neuro:  No gross focal deficits    Data Review:  I have reviewed all labs, meds, and studies from the last 24 hours:    Recent Results (from the past 24 hour(s))   EKG, 12 LEAD, INITIAL    Collection Time: 12/24/19  1:05 PM   Result Value Ref Range    Ventricular Rate 70 BPM    Atrial Rate 70 BPM    P-R Interval 158 ms    QRS Duration 96 ms    Q-T Interval 404 ms    QTC Calculation (Bezet) 436 ms    Calculated P Axis 56 degrees    Calculated R Axis 25 degrees    Calculated T Axis 55 degrees    Diagnosis       Normal sinus rhythm  Early repolarization  Normal ECG  When compared with ECG of 04-JUN-2019 12:28,  No significant change was found  Confirmed by Zack Ross MD (), YANA FABIAN ([id number]) on 12/24/2019 3:43:00 PM     CULTURE, BLOOD    Collection Time: 12/24/19  4:14 PM   Result Value Ref Range    Special Requests: RIGHT  ARM        Culture result: NO GROWTH AFTER 14 HOURS     TROPONIN I    Collection Time: 12/24/19  4:17 PM   Result Value Ref Range    Troponin-I, Qt. <0.02 (L) 0.02 - 0.05 NG/ML   CULTURE, BLOOD    Collection Time: 12/24/19  4:17 PM   Result Value Ref Range    Special Requests: LEFT  HAND        Culture result: NO GROWTH AFTER 14 HOURS     LACTIC ACID    Collection Time: 12/24/19  4:17 PM   Result Value Ref Range    Lactic acid 0.8 0.4 - 2.0 MMOL/L   PROCALCITONIN    Collection Time: 12/24/19  4:17 PM   Result Value Ref Range    Procalcitonin 0.10 ng/mL   HEMOGLOBIN A1C WITH EAG    Collection Time: 12/24/19  4:17 PM   Result Value Ref Range    Hemoglobin A1c 5.6 4.8 - 6.0 %    Est. average glucose 114 mg/dL   D DIMER    Collection Time: 12/24/19  4:17 PM   Result Value Ref Range    D DIMER 0.67 (HH) <0.56 ug/ml(FEU)   OSMOLALITY, SERUM/PLASMA    Collection Time: 12/24/19  4:17 PM   Result Value Ref Range    Osmolality, serum/plasma 279 275 - 295 MOSM/kg H2O   URINALYSIS W/ RFLX MICROSCOPIC    Collection Time: 12/24/19  6:13 PM   Result Value Ref Range    Color YELLOW      Appearance CLEAR Specific gravity 1.010 1.001 - 1.023      pH (UA) 5.5 5.0 - 9.0      Protein NEGATIVE  NEG mg/dL    Glucose NEGATIVE  mg/dL    Ketone NEGATIVE  NEG mg/dL    Bilirubin NEGATIVE  NEG      Blood TRACE (A) NEG      Urobilinogen 0.2 0.2 - 1.0 EU/dL    Nitrites NEGATIVE  NEG      Leukocyte Esterase NEGATIVE  NEG      WBC 0-3 0 /hpf    RBC 0-3 0 /hpf    Epithelial cells 0-3 0 /hpf    Bacteria 0 0 /hpf    Casts 0-3 0 /lpf   CULTURE, URINE    Collection Time: 12/24/19  6:13 PM   Result Value Ref Range    Special Requests: NO SPECIAL REQUESTS      Culture result:        NO GROWTH AFTER SHORT PERIOD OF INCUBATION. FURTHER RESULTS TO FOLLOW AFTER OVERNIGHT INCUBATION.    OSMOLALITY, UR    Collection Time: 12/24/19  6:13 PM   Result Value Ref Range    Osmolality,urine 326 50 - 1,400 MOSM/kg H2O   SODIUM, UR, RANDOM    Collection Time: 12/24/19  6:13 PM   Result Value Ref Range    Sodium,urine random 50 MMOL/L   CREATININE, UR, RANDOM    Collection Time: 12/24/19  6:13 PM   Result Value Ref Range    Creatinine, urine 43.90 mg/dL   CULTURE, BLOOD    Collection Time: 12/24/19  7:13 PM   Result Value Ref Range    Special Requests: RIGHT  Antecubital        Culture result: NO GROWTH AFTER 10 HOURS     METABOLIC PANEL, BASIC    Collection Time: 12/24/19  7:13 PM   Result Value Ref Range    Sodium 141 136 - 145 mmol/L    Potassium 3.8 3.5 - 5.1 mmol/L    Chloride 107 98 - 107 mmol/L    CO2 28 21 - 32 mmol/L    Anion gap 6 (L) 7 - 16 mmol/L    Glucose 99 65 - 100 mg/dL    BUN 10 6 - 23 MG/DL    Creatinine 0.66 0.6 - 1.0 MG/DL    GFR est AA >60 >60 ml/min/1.73m2    GFR est non-AA >60 >60 ml/min/1.73m2    Calcium 9.6 8.3 - 10.4 MG/DL   TROPONIN I    Collection Time: 12/24/19  7:13 PM   Result Value Ref Range    Troponin-I, Qt. <0.02 (L) 0.02 - 0.05 NG/ML   CBC W/O DIFF    Collection Time: 12/25/19 12:51 AM   Result Value Ref Range    WBC 13.0 (H) 4.3 - 11.1 K/uL    RBC 4.10 4.05 - 5.2 M/uL    HGB 12.0 11.7 - 15.4 g/dL    HCT 37.2 35.8 - 46.3 %    MCV 90.7 79.6 - 97.8 FL    MCH 29.3 26.1 - 32.9 PG    MCHC 32.3 31.4 - 35.0 g/dL    RDW 15.7 (H) 11.9 - 14.6 %    PLATELET 551 428 - 488 K/uL    MPV 9.7 9.4 - 12.3 FL    ABSOLUTE NRBC 0.00 0.0 - 0.2 K/uL   METABOLIC PANEL, BASIC    Collection Time: 12/25/19 12:51 AM   Result Value Ref Range    Sodium 140 136 - 145 mmol/L    Potassium 3.7 3.5 - 5.1 mmol/L    Chloride 108 (H) 98 - 107 mmol/L    CO2 28 21 - 32 mmol/L    Anion gap 4 (L) 7 - 16 mmol/L    Glucose 98 65 - 100 mg/dL    BUN 7 6 - 23 MG/DL    Creatinine 0.45 (L) 0.6 - 1.0 MG/DL    GFR est AA >60 >60 ml/min/1.73m2    GFR est non-AA >60 >60 ml/min/1.73m2    Calcium 9.2 8.3 - 10.4 MG/DL   TROPONIN I    Collection Time: 12/25/19 12:51 AM   Result Value Ref Range    Troponin-I, Qt. <0.02 (L) 0.02 - 0.05 NG/ML        All Micro Results     Procedure Component Value Units Date/Time    CULTURE, URINE [[id number]] Collected:  12/24/19 1813    Order Status:  Completed Specimen:  Urine from Clean catch Updated:  12/25/19 0728     Special Requests: NO SPECIAL REQUESTS        Culture result:       NO GROWTH AFTER SHORT PERIOD OF INCUBATION. FURTHER RESULTS TO FOLLOW AFTER OVERNIGHT INCUBATION.           CULTURE, BLOOD [[id number]] Collected:  12/24/19 1614    Order Status:  Completed Specimen:  Blood Updated:  12/25/19 0722     Special Requests: --        RIGHT  ARM       Culture result: NO GROWTH AFTER 14 HOURS       CULTURE, BLOOD [[id number]] Collected:  12/24/19 1617    Order Status:  Completed Specimen:  Blood Updated:  12/25/19 0722     Special Requests: --        LEFT  HAND       Culture result: NO GROWTH AFTER 14 HOURS       CULTURE, BLOOD [[id number]] Collected:  12/24/19 1913    Order Status:  Completed Specimen:  Blood Updated:  12/25/19 0722     Special Requests: --        RIGHT  Antecubital       Culture result: NO GROWTH AFTER 10 HOURS             Results for orders placed or performed during the hospital encounter of 12/24/19   2D ECHO LIMTED ADULT W OR GOPI/[facility] [address] [facility]  [phone]    Transthoracic Echocardiogram  2D and Doppler    Patient: Almaz Guzman  MR #: [id number]  : 1965  Age: 62 years  Gender: Female  Study date: 24-Dec-2019  Account #: [de-identified]  Height: 65 in  Weight: 237 lb  BSA: 2.13 mï¾²  Status:Stat  Location: [facility]  BP: 119/ 66    Allergies: ACE INHIBITORS, DIPHENHYDRAMINE HCL, CIPROFLOXACIN, GABAPENTIN,  IBUPROFEN, PENICILLINS, CLOPIDOGREL, BUPROPION HCL    Reading Physician:  Arun Boston MD [facility]  Sonographer:  Yakov Abebe RDCS  Group:  [id number] Cardiology  Referring Physician:  Patrica Reardon PA-C    INDICATIONS: Pericardial Effusion. PROCEDURE: This was a stat study. A transthoracic echocardiogram was   performed. The study included limited 2D imaging and limited spectral Doppler. Image  quality was adequate. LEFT VENTRICLE: Size was normal. Systolic function was normal. Ejection  fraction was estimated in the range of 60 % to 65 %. Wall thickness was   mildly  increased. RIGHT VENTRICLE: The size was normal.    LEFT ATRIUM: The atrium was moderately dilated. RIGHT ATRIUM: Size was normal.    PERICARDIUM: A small to moderate pericardial effusion was identified. There   was  no evidence of hemodynamic compromise. SUMMARY:    -  Left ventricle: Systolic function was normal. Ejection fraction was  estimated in the range of 60 % to 65 %. Wall thickness was mildly increased. -  Left atrium: The atrium was moderately dilated. -  Pericardium: A small to moderate pericardial effusion was identified. There  was no evidence of hemodynamic compromise.     Prepared and signed by    Arun Boston MD [facility]  Signed 24-Dec-2019 16:42:36         Current Meds:  Current Facility-Administered Medications   Medication Dose Route Frequency    amitriptyline (ELAVIL) tablet 50 mg  50 mg Oral QHS    aspirin delayed-release tablet 81 mg  81 mg Oral DAILY    diazePAM (VALIUM) tablet 5 mg  5 mg Oral BID    flecainide (TAMBOCOR) tablet 50 mg  50 mg Oral BID    FLUoxetine (PROzac) capsule 20 mg  20 mg Oral DAILY    meclizine (ANTIVERT) tablet 25 mg  25 mg Oral TID PRN    metoprolol succinate (TOPROL-XL) XL tablet 25 mg  25 mg Oral DAILY    pantoprazole (PROTONIX) tablet 40 mg  40 mg Oral ACB    pravastatin (PRAVACHOL) tablet 40 mg  40 mg Oral QHS    promethazine (PHENERGAN) tablet 25 mg  25 mg Oral Q6H PRN    rivaroxaban (XARELTO) tablet 20 mg  20 mg Oral DAILY WITH BREAKFAST    valsartan (DIOVAN) tablet 80 mg  80 mg Oral DAILY    verapamil ER (CALAN-SR) tablet 120 mg  120 mg Oral QHS    zolpidem (AMBIEN) tablet 5 mg  5 mg Oral QHS PRN    sodium chloride (NS) flush 5-40 mL  5-40 mL IntraVENous PRN    nitroglycerin (NITROSTAT) tablet 0.4 mg  0.4 mg SubLINGual Q5MIN PRN    acetaminophen (TYLENOL) tablet 650 mg  650 mg Oral Q4H PRN    HYDROcodone-acetaminophen (NORCO) 7.5-325 mg per tablet 1 Tab  1 Tab Oral Q4H PRN    promethazine (PHENERGAN) with saline injection 12.5 mg  12.5 mg IntraVENous Q6H PRN    perphenazine (TRILAFON) tablet tab 16 mg  16 mg Oral QHS    tuberculin injection 5 Units  5 Units IntraDERMal ONCE       Other Studies (last 24 hours):  Xr Chest Pa Lat    Result Date: 12/24/2019  PA AND LATERAL CHEST X-RAY. Clinical Indication: Sepsis, leukocytosis Comparison: No prior FINDINGS: Two views of the chest show an enlarged cardiac silhouette. The lungs are expanded and clear. No pleural effusion or pneumothorax. Bones are osteopenic. IMPRESSION: Enlarged cardiac silhouette, otherwise no acute abnormality. Ct Chest W Cont    Result Date: 12/24/2019  EXAM: CT Pulmonary angiogram. INDICATION: Chest pain since last night worsened with inspiration. Recent seizures. Comparison: Cardiac CT dated July 31, 2019.  Multiple axial images were obtained through the chest during intravenous infusion of 100mL of Isovue 370. Coronal 2D MIP image reformats were performed. Radiation dose reduction techniques were used for this study: All CT scans performed at this facility use one or all of the following: Automated exposure control, adjustment of the mA and/or kVp according to patient's size, iterative reconstruction. FINDINGS: Examination is diagnostic through the segmental level. No evidence of acute or chronic pulmonary embolism. The main pulmonary artery and thoracic aorta are normal in caliber. LUNGS/PLEURA: Expiratory exam. No pneumothorax or pleural effusion. Scattered atelectasis of the lung bases. No focal lung consolidation. MEDIASTINUM: Interval increase in size of a moderately-sized pericardial effusion without evidence of mass effect upon the right atrium. Subcentimeter thyroid nodule without a suspicious thyroid nodule. Enlarged prevascular space lymph nodes. Esophagus is unremarkable. BONES/SUBCUTANEOUS TISSUE: No aggressive osseous lesion. No subcutaneous soft tissue abnormality. UPPER ABDOMEN: Limited visualization of the upper abdomen is unremarkable. IMPRESSION: 1. No evidence of acute pulmonary embolism. 2. Interval enlargement of an intermediate density pericardial effusion, now moderate to large with enlarged prevascular space lymph nodes, possibly reactive, indeterminate.        Assessment and Plan:     Hospital Problems as of 12/25/2019 Date Reviewed: 12/12/2019          Codes Class Noted - Resolved POA    PAF ([facility]oxys[facility]) ([facility] ICD-10-CM: I48.0  ICD-9-CM: 427.31  12/24/2019 - Present Unknown        * (Principal) Chest pain ICD-10-CM: R07.9  ICD-9-CM: 786.50  12/24/2019 - Present Unknown        Polydipsia ICD-10-CM: R63.1  ICD-9-CM: 783.5  12/24/2019 - Present Unknown        Mentally challenged ICD-10-CM: F79  ICD-9-CM: 243  12/24/2019 - Present Unknown        Atypical chest pain ICD-10-CM: R07.89  ICD-9-CM: 786.59  12/24/2019 - Present Unknown        Essential hypertension ICD-10-CM: I10  ICD-9-CM: 401.9  11/8/2019 - Present Yes        Pericardial effusion ICD-10-CM: I31.3  ICD-9-CM: 423.9  10/15/2019 - Present Yes        Leukocytosis (Chronic) ICD-10-CM: J06.040  ICD-9-CM: 288.60  6/3/2019 - Present Yes              Plan: This is a 57-year-old female with:    Leukocytosis chronic  No signs of acute infection. Lactic acid and pro calcitonin within normal limits. Chronic pericardial effusion  Stable on echocardiogram.  No hemodynamic instability. Cardiology primary. Chest pain atypical  Cardiology primary. Stress test as outpatient. Psychogenic polydipsia:  Fluid restrictions to less than 2 L per 24 hours. Patient and her mother instructed. Chronic atrial fibrillation rate controlled POA  Continue home medications flecainide beta-blocker verapamil and Xarelto. Hypertension  Continue home medications    Mood disorder  Continue home medications Prozac perphenazine and amitriptyline    Thank you for allowing us the pleasure of participating in the care of this patient. Patient is being discharged home today per primary cardiology. DC planning/Dispo: Home today per primary cardiology.       Diet:  DIET CARDIAC  DVT ppx: Xarelto    Signed:  Barbie Jackson MD

## 2019-12-27 LAB
BACTERIA SPEC CULT: NORMAL
SERVICE CMNT-IMP: NORMAL

## 2019-12-29 LAB
BACTERIA SPEC CULT: NORMAL
SERVICE CMNT-IMP: NORMAL

## 2020-02-17 NOTE — PROGRESS NOTES
CT images from [facility] reviewed with Radiologist and he is in agreement that this appears to be Pericardial cyst with no real change in size since 2017. Dr. Edita Epps feels with symptomatic treatment with motrin initially but she is allergic --with hives, would continue PRN Tylenol. Will arrange follow up. Off 2/18/20 and 2/20/20  ill

## 2022-03-18 PROBLEM — R01.1 MURMUR, CARDIAC: Status: ACTIVE | Noted: 2017-09-27

## 2022-03-18 PROBLEM — F32.A ANXIETY AND DEPRESSION: Status: ACTIVE | Noted: 2017-12-28

## 2022-03-18 PROBLEM — R73.03 PREDIABETES: Status: ACTIVE | Noted: 2019-06-03

## 2022-03-18 PROBLEM — R07.2 PRECORDIAL PAIN: Status: ACTIVE | Noted: 2017-12-28

## 2022-03-18 PROBLEM — E66.09 OBESITY DUE TO EXCESS CALORIES: Status: ACTIVE | Noted: 2019-11-08

## 2022-03-18 PROBLEM — R07.9 CHEST PAIN: Status: ACTIVE | Noted: 2019-12-24

## 2022-03-18 PROBLEM — F41.9 ANXIETY AND DEPRESSION: Status: ACTIVE | Noted: 2017-12-28

## 2022-03-18 PROBLEM — I10 ESSENTIAL HYPERTENSION: Status: ACTIVE | Noted: 2019-11-08

## 2022-03-19 PROBLEM — R63.1 POLYDIPSIA: Status: ACTIVE | Noted: 2019-12-24

## 2022-03-19 PROBLEM — D72.829 LEUKOCYTOSIS: Status: ACTIVE | Noted: 2019-06-03

## 2022-03-19 PROBLEM — Q24.8 PERICARDIAL CYST: Status: ACTIVE | Noted: 2019-06-03

## 2022-03-19 PROBLEM — R93.1 AGATSTON CORONARY ARTERY CALCIUM SCORE LESS THAN 100: Status: ACTIVE | Noted: 2019-10-15

## 2022-03-19 PROBLEM — R07.89 ATYPICAL CHEST PAIN: Status: ACTIVE | Noted: 2019-12-24

## 2022-03-19 PROBLEM — I48.91 ATRIAL FIBRILLATION (HCC): Status: ACTIVE | Noted: 2019-10-15

## 2022-03-19 PROBLEM — I31.39 PERICARDIAL EFFUSION: Status: ACTIVE | Noted: 2019-10-15

## 2022-03-19 PROBLEM — F79 MENTALLY CHALLENGED: Status: ACTIVE | Noted: 2019-12-24

## 2022-03-19 PROBLEM — I48.0 PAF (PAROXYSMAL ATRIAL FIBRILLATION) (HCC): Status: ACTIVE | Noted: 2019-12-24

## 2022-05-16 DIAGNOSIS — E78.00 PURE HYPERCHOLESTEROLEMIA: Primary | ICD-10-CM

## 2022-07-28 ENCOUNTER — TELEPHONE (OUTPATIENT)
Dept: CARDIOLOGY CLINIC | Age: 57
End: 2022-07-28

## 2022-07-28 DIAGNOSIS — R42 LIGHT HEADED: ICD-10-CM

## 2022-07-28 DIAGNOSIS — I48.0 PAF (PAROXYSMAL ATRIAL FIBRILLATION) (HCC): ICD-10-CM

## 2022-07-28 DIAGNOSIS — I48.91 ATRIAL FIBRILLATION (HCC): Primary | ICD-10-CM

## 2022-07-28 NOTE — TELEPHONE ENCOUNTER
Patient's mother called stating patient has been having palpitations the last couple of days and her heart rate is dropping in the evenings-ranging from 48-61. No active chest pain. Please call patient and advise.

## 2022-07-28 NOTE — TELEPHONE ENCOUNTER
Increased palpitations with increased fluttering feeling in chest, all day, x 2-3 days. Radial pulse feels irregular. At night, HR-48, BP-120/60. During the day, HR-59-60, HR-110/60. Frequent light headedness when sitting or standing. Not feeling good. Looks a little bit pale. Increased sweating with exertion. Denies any chest pain or SOB. Taking amlodipine 2.5 mg qd, valsartan 160 mg qd, Toprol XL 25 mg qd, Flecainide 50 mg qd, and Xarelto 20 mg qd. Lives in [place]. Patient is mentally challenged. Elderly mother cannot drive to [facility] for appointments. Does not want to go to ER, unless absolutely necessary. Mother, Mal Lobo, asks for any recommendations for palpitations, low HR at night, and light headedness.

## 2022-07-28 NOTE — TELEPHONE ENCOUNTER
Advised patient of Dr. Eric Du response. Scheduled EA nurse visit tomorrow at 1:30 pm for 48 hour Zio monitor. Tutu Garcia to call with any further questions or concerns. Carmelina Palomo verbalized understanding.  She declined FU appointments with  Dr. Emma Null at [facility] [place] [facility] [place], stating she is going to be having back surgery and will not be able to drive to  or [place] in August.

## 2022-07-28 NOTE — TELEPHONE ENCOUNTER
Per Dr. Dinora Barry, patient may schedule EA nurse visit, tomorrow, for 48 hour Zio monitor, if she has not worn monitor, recently. If she has worn monitor, recently, she should continue with current meds until Dr. Pam Vargas return to office, next week.

## 2022-07-29 ENCOUNTER — NURSE ONLY (OUTPATIENT)
Dept: CARDIOLOGY CLINIC | Age: 57
End: 2022-07-29

## 2022-07-29 NOTE — PROGRESS NOTES
Patient in office for a scheduled 48 hour Zio. Monitor was placed and instructions given with understanding.

## 2022-08-01 NOTE — TELEPHONE ENCOUNTER
Tutu Garcia of Dr. Genevieve Thomas response. Scheduled next available [place] appointment with Dr. Emma Null on 8/31/22 at 2:15 pm. Tutu Garcia to call with any further questions or concerns prior to appointment. Carmelina Palomo verbalized understanding.

## 2022-08-01 NOTE — TELEPHONE ENCOUNTER
MD Ruben Mendiola, RN  Caller: Unspecified (4 days ago,  1:10 PM)  Patient can hold taking her amlodipine since she gets dizzy and her blood pressure is not high she probably has episodes of paroxysmal atrial for which she has had she is limited and handicapped and her mother usually brings her.

## 2022-08-31 ENCOUNTER — OFFICE VISIT (OUTPATIENT)
Dept: CARDIOLOGY CLINIC | Age: 57
End: 2022-08-31
Payer: MEDICARE

## 2022-08-31 VITALS
SYSTOLIC BLOOD PRESSURE: 156 MMHG | HEART RATE: 60 BPM | DIASTOLIC BLOOD PRESSURE: 86 MMHG | WEIGHT: 250.3 LBS | HEIGHT: 65 IN | BODY MASS INDEX: 41.7 KG/M2

## 2022-08-31 DIAGNOSIS — I48.0 PAF (PAROXYSMAL ATRIAL FIBRILLATION) (HCC): ICD-10-CM

## 2022-08-31 DIAGNOSIS — E66.01 CLASS 3 SEVERE OBESITY DUE TO EXCESS CALORIES WITH SERIOUS COMORBIDITY AND BODY MASS INDEX (BMI) OF 40.0 TO 44.9 IN ADULT (HCC): ICD-10-CM

## 2022-08-31 DIAGNOSIS — R93.1 AGATSTON CORONARY ARTERY CALCIUM SCORE LESS THAN 100: ICD-10-CM

## 2022-08-31 DIAGNOSIS — I10 ESSENTIAL HYPERTENSION: Primary | ICD-10-CM

## 2022-08-31 PROCEDURE — 1036F TOBACCO NON-USER: CPT | Performed by: INTERNAL MEDICINE

## 2022-08-31 PROCEDURE — G8417 CALC BMI ABV UP PARAM F/U: HCPCS | Performed by: INTERNAL MEDICINE

## 2022-08-31 PROCEDURE — 99214 OFFICE O/P EST MOD 30 MIN: CPT | Performed by: INTERNAL MEDICINE

## 2022-08-31 PROCEDURE — 3017F COLORECTAL CA SCREEN DOC REV: CPT | Performed by: INTERNAL MEDICINE

## 2022-08-31 PROCEDURE — G8428 CUR MEDS NOT DOCUMENT: HCPCS | Performed by: INTERNAL MEDICINE

## 2022-08-31 RX ORDER — FLUCONAZOLE 150 MG/1
150 TABLET ORAL ONCE
COMMUNITY

## 2022-08-31 NOTE — PROGRESS NOTES
[address], [address] [employer] [facility], [address] [place] [address]  PHONE: [phone]    Aurelia Samuel  1965      SUBJECTIVE:   Aurelia Samuel is a 59 y.o. female seen for a follow up visit regarding the following:     Chief Complaint   Patient presents with    Atrial Fibrillation     Monitor results       HPI:    This 61-year-old female returns for follow-up she had history of a small pericardial effusion in the past reported possible pericardial cyst which has been stable she has had some PAF which we have had her on flecainide therapy. She has done well with that. Recently she was feeling some palpitations and thought her blood pressure lowers we held her hold her Norvasc was stuck a monitor on her. On the monitor she is in sinus rhythm some occasional PACs but did not have atrial fib she would mohit the monitor and she been sinus rhythm no correlation with PACs. She is doing better right now on her mother who brought her in today as he is going need some major orthopedic surgery has to find some help for her handicapped daughter       Past Medical History, Past Surgical History, Family history, Social History, and Medications were all reviewed with the patient today and updated as necessary. Allergies   Allergen Reactions    Ace Inhibitors Other (See Comments)    Bupropion Other (See Comments)    Ciprofloxacin Other (See Comments)    Clopidogrel Other (See Comments)    Diphenhydramine Other (See Comments)    Gabapentin Other (See Comments)     Balance     Ibuprofen Other (See Comments)    Penicillins Other (See Comments)     Past Medical History:   Diagnosis Date    Allergic rhinitis     Depression     Fibrocystic disease of breast     Gastroesophageal reflux disease     Hyperlipidemia     Hypertension      Past Surgical History:   Procedure Laterality Date    OTHER SURGICAL HISTORY  2007    cyst removed from right breast    TONSILLECTOMY       No family history on file.    Social History Tobacco Use    Smoking status: Never    Smokeless tobacco: Never   Substance Use Topics    Alcohol use: No       ROS:    Review of Systems   Constitutional: Negative for decreased appetite and weight loss. Cardiovascular:  Positive for palpitations. Negative for chest pain. PHYSICAL EXAM:    BP (!) 156/86   Pulse 60   Ht 5' 5\" (1.651 m)   Wt 250 lb 4.8 oz (113.5 kg)   BMI 41.65 kg/m²        Wt Readings from Last 3 Encounters:   08/31/22 250 lb 4.8 oz (113.5 kg)   04/05/22 252 lb (114.3 kg)   09/29/21 252 lb (114.3 kg)     BP Readings from Last 3 Encounters:   08/31/22 (!) 156/86   04/05/22 130/76   09/29/21 (!) 150/90         Physical Exam  Constitutional:       General: She is not in acute distress. Cardiovascular:      Rate and Rhythm: Normal rate and regular rhythm. Heart sounds:     No gallop. Musculoskeletal:         General: No swelling. Neurological:      Mental Status: She is alert. Medical problems and test results were reviewed with the patient today. No results found for any visits on 08/31/22. Lab Results   Component Value Date/Time     12/25/2019 12:51 AM    K 3.7 12/25/2019 12:51 AM     12/25/2019 12:51 AM    CO2 28 12/25/2019 12:51 AM    BUN 7 12/25/2019 12:51 AM    GFRAA >60 12/25/2019 12:51 AM     Lab Results   Component Value Date/Time    CHOL 177 06/04/2019 04:40 AM    HDL 73 06/04/2019 04:40 AM     Review of her monitor showed a sinus rhythm occasional PACs no pauses no atrial fibs    ASSESSMENT and PLAN    Lexie Parrish was seen today for atrial fibrillation. Diagnoses and all orders for this visit:    Essential hypertension her blood pressure little bit high today elevated call back and said it was low so we held the Norvasc. I told her mother to keep checking it at home if it runs back up we will start her back on globin Norvasc and she can use a dose.     PAF (paroxysmal atrial fibrillation) (HCC)  Her monitor has not shown atrial fibs does not mean she could not breakthrough at times will not change the flecainide currently at 50 twice a day  Agatston coronary artery calcium score less than 100 she is a low score no pain    Class 3 severe obesity due to excess calories with serious comorbidity and body mass index (BMI) of 40.0 to 44.9 in [facility]) I will continue to courage weight loss. Hyperlipidemia patient will stay on Lipitor 40    [unfilled]      No follow-up provider specified.     Shawn Winslow MD  8/31/2022  3:24 PM

## 2022-09-26 ENCOUNTER — TELEPHONE (OUTPATIENT)
Dept: CARDIOLOGY CLINIC | Age: 57
End: 2022-09-26

## 2022-09-26 DIAGNOSIS — R61 PERSPIRATION EXCESSIVE: ICD-10-CM

## 2022-09-26 DIAGNOSIS — I48.91 ATRIAL FIBRILLATION (HCC): Primary | ICD-10-CM

## 2022-09-26 DIAGNOSIS — R53.83 FATIGUE: ICD-10-CM

## 2022-09-26 NOTE — TELEPHONE ENCOUNTER
Pt mother Smitha Rayo) states that Elizabeth Menard gets short of breath and sweats when she exercises.  Wants to know if this is normal.

## 2022-09-26 NOTE — TELEPHONE ENCOUNTER
MD Dora Sims, RN  Caller: Unspecified (Today,  2:14 PM)  Patient can go to [facility] office and get an EKG tomorrow.

## 2022-09-26 NOTE — TELEPHONE ENCOUNTER
Royann Goodell of Dr. Albert Gentle response. Scheduled EA nurse visit tomorrow at 3:15 pm for EKG. Amisha Fernández verbalized understanding.

## 2022-09-27 ENCOUNTER — NURSE ONLY (OUTPATIENT)
Dept: CARDIOLOGY CLINIC | Age: 57
End: 2022-09-27
Payer: MEDICARE

## 2022-09-27 DIAGNOSIS — I48.0 PAF (PAROXYSMAL ATRIAL FIBRILLATION) (HCC): Primary | ICD-10-CM

## 2022-09-27 PROCEDURE — 93000 ELECTROCARDIOGRAM COMPLETE: CPT | Performed by: INTERNAL MEDICINE

## 2022-09-27 NOTE — PROGRESS NOTES
Pt in for EKG. Pt states she has had dizziness at home and feeling like she is going to pass out. She also reported some fluttering of her heart over weekend. She stated she called EMS due to near syncope last night and they did some EKG's. She brought in 2 rhythm strips from EMS that revealed Sinus Rhythm. Pt stated she took a Meclizine and the symptoms resolved. She confirmed the symptoms improves when she takes this medication. EKG today was Sinus Rhythm and Dr. Paradise Marie notified. He states no new orders at this time and would like pt to continue taking Meclizine as needed. Pt verbalized understanding and was instructed to call back for any new issues or questions.

## 2022-10-03 RX ORDER — VALSARTAN 160 MG/1
TABLET ORAL
Qty: 90 TABLET | Refills: 1 | Status: SHIPPED | OUTPATIENT
Start: 2022-10-03

## 2022-10-03 NOTE — TELEPHONE ENCOUNTER
MEDICATION REFILL REQUEST      Name of Medication:  Atorvastatin  Dose:  40 mg   Frequency:  1  pill a day  Quantity:  90  Days' supply:  90      Pharmacy Name/Location:  [id number]    MEDICATION REFILL REQUEST      Name of Medication:  Metoprolol Succinate  Dose:  25 mg  Frequency:  1 pill a day  Quantity:  90  Days' supply:  90      Pharmacy Name/Location:  [id number]

## 2022-10-04 RX ORDER — METOPROLOL SUCCINATE 25 MG/1
25 TABLET, EXTENDED RELEASE ORAL DAILY
Qty: 90 TABLET | Refills: 1 | Status: SHIPPED | OUTPATIENT
Start: 2022-10-04

## 2022-10-04 RX ORDER — ATORVASTATIN CALCIUM 40 MG/1
40 TABLET, FILM COATED ORAL DAILY
Qty: 90 TABLET | Refills: 1 | Status: SHIPPED | OUTPATIENT
Start: 2022-10-04

## 2022-10-10 RX ORDER — FLECAINIDE ACETATE 50 MG/1
50 TABLET ORAL 2 TIMES DAILY
Qty: 180 TABLET | Refills: 3 | Status: SHIPPED | OUTPATIENT
Start: 2022-10-10

## 2022-10-10 NOTE — TELEPHONE ENCOUNTER
MEDICATION REFILL REQUEST      Name of Medication:  Flecainide   Dose:  50 mg  Frequency:    Quantity:    Days' supply:  90      Pharmacy Name/Location:  did not leave

## 2022-10-28 NOTE — TELEPHONE ENCOUNTER
Requested Prescriptions     Pending Prescriptions Disp Refills    rivaroxaban (XARELTO) 20 MG TABS tablet 90 tablet 3     Sig: Take 1 tablet by mouth Daily with supper TAKE 1 TABLET BY MOUTH ONCE DAILY WITH LUNCH

## 2022-12-20 ENCOUNTER — OFFICE VISIT (OUTPATIENT)
Dept: CARDIOLOGY CLINIC | Age: 57
End: 2022-12-20
Payer: MEDICARE

## 2022-12-20 VITALS
BODY MASS INDEX: 40.82 KG/M2 | DIASTOLIC BLOOD PRESSURE: 72 MMHG | HEIGHT: 65 IN | WEIGHT: 245 LBS | SYSTOLIC BLOOD PRESSURE: 118 MMHG | HEART RATE: 68 BPM

## 2022-12-20 DIAGNOSIS — R93.1 AGATSTON CORONARY ARTERY CALCIUM SCORE LESS THAN 100: ICD-10-CM

## 2022-12-20 DIAGNOSIS — E66.01 CLASS 3 SEVERE OBESITY DUE TO EXCESS CALORIES WITH SERIOUS COMORBIDITY AND BODY MASS INDEX (BMI) OF 40.0 TO 44.9 IN ADULT (HCC): ICD-10-CM

## 2022-12-20 DIAGNOSIS — I48.0 PAF (PAROXYSMAL ATRIAL FIBRILLATION) (HCC): Primary | ICD-10-CM

## 2022-12-20 DIAGNOSIS — I10 ESSENTIAL HYPERTENSION: ICD-10-CM

## 2022-12-20 DIAGNOSIS — F79 MENTALLY CHALLENGED: ICD-10-CM

## 2022-12-20 DIAGNOSIS — Q24.8 PERICARDIAL CYST: ICD-10-CM

## 2022-12-20 PROCEDURE — G8417 CALC BMI ABV UP PARAM F/U: HCPCS | Performed by: INTERNAL MEDICINE

## 2022-12-20 PROCEDURE — 1036F TOBACCO NON-USER: CPT | Performed by: INTERNAL MEDICINE

## 2022-12-20 PROCEDURE — 99214 OFFICE O/P EST MOD 30 MIN: CPT | Performed by: INTERNAL MEDICINE

## 2022-12-20 PROCEDURE — 3017F COLORECTAL CA SCREEN DOC REV: CPT | Performed by: INTERNAL MEDICINE

## 2022-12-20 PROCEDURE — 3078F DIAST BP <80 MM HG: CPT | Performed by: INTERNAL MEDICINE

## 2022-12-20 PROCEDURE — 3074F SYST BP LT 130 MM HG: CPT | Performed by: INTERNAL MEDICINE

## 2022-12-20 PROCEDURE — G8428 CUR MEDS NOT DOCUMENT: HCPCS | Performed by: INTERNAL MEDICINE

## 2022-12-20 PROCEDURE — G8484 FLU IMMUNIZE NO ADMIN: HCPCS | Performed by: INTERNAL MEDICINE

## 2022-12-20 RX ORDER — MULTIVITAMIN WITH IRON
250 TABLET ORAL DAILY
COMMUNITY

## 2022-12-20 RX ORDER — CYANOCOBALAMIN (VITAMIN B-12) 500 MCG
TABLET ORAL
COMMUNITY

## 2022-12-20 ASSESSMENT — ENCOUNTER SYMPTOMS: SHORTNESS OF BREATH: 0

## 2022-12-20 NOTE — PROGRESS NOTES
[address], [address] [facility], [address] [place] [address]  PHONE: [phone]    Willow Smith  1965      SUBJECTIVE:   Willow Smith is a 59 y.o. female seen for a follow up visit regarding the following:     Chief Complaint   Patient presents with    Coronary Artery Disease       HPI:    49-year-old female comes back for follow-up she has had a history of a pericardial cyst history of some paroxysmal atrial fibs on flecainide she has had some coronary calcification with no symptoms. She has been doing okay since I saw her last.  She has been mentally challenged and her mother usually brings her and her mother could not come in today because of her health issues. She states that she has been doing well with no palpitations. Past Medical History, Past Surgical History, Family history, Social History, and Medications were all reviewed with the patient today and updated as necessary. Allergies   Allergen Reactions    Ace Inhibitors Other (See Comments)    Bupropion Other (See Comments)    Ciprofloxacin Other (See Comments)    Clopidogrel Other (See Comments)    Diphenhydramine Other (See Comments)    Gabapentin Other (See Comments)     Balance     Ibuprofen Other (See Comments)    Penicillins Other (See Comments)     Past Medical History:   Diagnosis Date    Allergic rhinitis     Depression     Fibrocystic disease of breast     Gastroesophageal reflux disease     Hyperlipidemia     Hypertension      Past Surgical History:   Procedure Laterality Date    OTHER SURGICAL HISTORY  2007    cyst removed from right breast    TONSILLECTOMY       No family history on file. Social History     Tobacco Use    Smoking status: Never    Smokeless tobacco: Never   Substance Use Topics    Alcohol use: No       ROS:    Review of Systems   Constitutional: Negative for decreased appetite and weight loss. Cardiovascular:  Negative for chest pain, irregular heartbeat, leg swelling and palpitations. Respiratory:  Negative for shortness of breath. Hematologic/Lymphatic: Negative for bleeding problem. PHYSICAL EXAM:    /72   Pulse 68   Ht 5' 5\" (1.651 m)   Wt 245 lb (111.1 kg)   BMI 40.77 kg/m²        Wt Readings from Last 3 Encounters:   12/20/22 245 lb (111.1 kg)   08/31/22 250 lb 4.8 oz (113.5 kg)   04/05/22 252 lb (114.3 kg)     BP Readings from Last 3 Encounters:   12/20/22 118/72   08/31/22 (!) 156/86   04/05/22 130/76         Physical Exam  Constitutional:       General: She is not in acute distress. Cardiovascular:      Rate and Rhythm: Normal rate and regular rhythm. Heart sounds: No murmur heard. No friction rub. No gallop. Musculoskeletal:         General: No swelling. Neurological:      Mental Status: She is alert. Medical problems and test results were reviewed with the patient today. No results found for any visits on 12/20/22. Lab Results   Component Value Date/Time     12/25/2019 12:51 AM    K 3.7 12/25/2019 12:51 AM     12/25/2019 12:51 AM    CO2 28 12/25/2019 12:51 AM    BUN 7 12/25/2019 12:51 AM    GFRAA >60 12/25/2019 12:51 AM     Lab Results   Component Value Date/Time    CHOL 177 06/04/2019 04:40 AM    HDL 73 06/04/2019 04:40 AM         ASSESSMENT and Bee Richmond was seen today for coronary artery disease.     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 Class 3 severe obesity due to excess calories with serious comorbidity and body mass index (BMI) of 40.0 to 44.9 in adult [facility]) I encouraged weight loss. [unfilled]      No follow-up provider specified.     Enoch Diaz MD  12/20/2022  2:05 PM

## 2024-05-09 NOTE — PROGRESS NOTES
Spoke with CATALINA Lau about echo tech's being on call today, per Prakash Rojas echo can be completed tomorrow. alert and oriented x3 negative